# Patient Record
Sex: FEMALE | Race: WHITE | Employment: OTHER | URBAN - METROPOLITAN AREA
[De-identification: names, ages, dates, MRNs, and addresses within clinical notes are randomized per-mention and may not be internally consistent; named-entity substitution may affect disease eponyms.]

---

## 2018-05-29 LAB
CHLAMYDIA, EXTERNAL: NEGATIVE
HBSAG, EXTERNAL: NEGATIVE
HIV, EXTERNAL: NON REACTIVE
N. GONORRHEA, EXTERNAL: NEGATIVE
RUBELLA, EXTERNAL: NORMAL
TYPE, ABO & RH, EXTERNAL: NORMAL

## 2018-08-22 LAB
ANTIBODY SCREEN, EXTERNAL: NEGATIVE
T. PALLIDUM, EXTERNAL: NEGATIVE

## 2018-10-03 ENCOUNTER — HOSPITAL ENCOUNTER (OUTPATIENT)
Age: 33
Setting detail: OBSERVATION
Discharge: HOME | End: 2018-10-04
Attending: EMERGENCY MEDICINE | Admitting: OBSTETRICS & GYNECOLOGY
Payer: MEDICAID

## 2018-10-03 DIAGNOSIS — R07.9 CHEST PAIN, UNSPECIFIED TYPE: Primary | ICD-10-CM

## 2018-10-03 PROBLEM — O13.3 PIH (PREGNANCY INDUCED HYPERTENSION), THIRD TRIMESTER: Status: ACTIVE | Noted: 2018-10-03

## 2018-10-03 LAB
ALBUMIN SERPL-MCNC: 2.9 G/DL (ref 3.5–5)
ALBUMIN/GLOB SERPL: 0.7 {RATIO} (ref 1.1–2.2)
ALP SERPL-CCNC: 116 U/L (ref 45–117)
ALT SERPL-CCNC: 20 U/L (ref 12–78)
ANION GAP SERPL CALC-SCNC: 7 MMOL/L (ref 5–15)
APPEARANCE UR: CLEAR
AST SERPL-CCNC: 19 U/L (ref 15–37)
BASOPHILS # BLD: 0 K/UL (ref 0–0.1)
BASOPHILS NFR BLD: 0 % (ref 0–1)
BILIRUB SERPL-MCNC: 0.3 MG/DL (ref 0.2–1)
BILIRUB UR QL: NEGATIVE
BNP SERPL-MCNC: 249 PG/ML (ref 0–125)
BUN SERPL-MCNC: 4 MG/DL (ref 6–20)
BUN/CREAT SERPL: 9 (ref 12–20)
CALCIUM SERPL-MCNC: 9 MG/DL (ref 8.5–10.1)
CHLORIDE SERPL-SCNC: 106 MMOL/L (ref 97–108)
CO2 SERPL-SCNC: 23 MMOL/L (ref 21–32)
COLOR UR: NORMAL
CREAT SERPL-MCNC: 0.47 MG/DL (ref 0.55–1.02)
CREAT UR-MCNC: 34.5 MG/DL
D DIMER PPP FEU-MCNC: 0.7 MG/L FEU (ref 0–0.65)
DIFFERENTIAL METHOD BLD: ABNORMAL
EOSINOPHIL # BLD: 0.2 K/UL (ref 0–0.4)
EOSINOPHIL NFR BLD: 2 % (ref 0–7)
ERYTHROCYTE [DISTWIDTH] IN BLOOD BY AUTOMATED COUNT: 12.5 % (ref 11.5–14.5)
GLOBULIN SER CALC-MCNC: 3.9 G/DL (ref 2–4)
GLUCOSE SERPL-MCNC: 75 MG/DL (ref 65–100)
GLUCOSE UR STRIP.AUTO-MCNC: NEGATIVE MG/DL
HCT VFR BLD AUTO: 33.5 % (ref 35–47)
HGB BLD-MCNC: 12 G/DL (ref 11.5–16)
HGB UR QL STRIP: NEGATIVE
IMM GRANULOCYTES # BLD: 0.1 K/UL (ref 0–0.04)
IMM GRANULOCYTES NFR BLD AUTO: 1 % (ref 0–0.5)
KETONES UR QL STRIP.AUTO: NEGATIVE MG/DL
LEUKOCYTE ESTERASE UR QL STRIP.AUTO: NEGATIVE
LYMPHOCYTES # BLD: 2.2 K/UL (ref 0.8–3.5)
LYMPHOCYTES NFR BLD: 18 % (ref 12–49)
MCH RBC QN AUTO: 32.3 PG (ref 26–34)
MCHC RBC AUTO-ENTMCNC: 35.8 G/DL (ref 30–36.5)
MCV RBC AUTO: 90.3 FL (ref 80–99)
MONOCYTES # BLD: 1.1 K/UL (ref 0–1)
MONOCYTES NFR BLD: 9 % (ref 5–13)
NEUTS SEG # BLD: 8.4 K/UL (ref 1.8–8)
NEUTS SEG NFR BLD: 71 % (ref 32–75)
NITRITE UR QL STRIP.AUTO: NEGATIVE
NRBC # BLD: 0 K/UL (ref 0–0.01)
NRBC BLD-RTO: 0 PER 100 WBC
PH UR STRIP: 7.5 [PH] (ref 5–8)
PLATELET # BLD AUTO: 281 K/UL (ref 150–400)
PMV BLD AUTO: 10.8 FL (ref 8.9–12.9)
POTASSIUM SERPL-SCNC: 3.5 MMOL/L (ref 3.5–5.1)
PROT SERPL-MCNC: 6.8 G/DL (ref 6.4–8.2)
PROT UR STRIP-MCNC: NEGATIVE MG/DL
PROT UR-MCNC: 9 MG/DL (ref 0–11.9)
PROT/CREAT UR-RTO: 0.3
RBC # BLD AUTO: 3.71 M/UL (ref 3.8–5.2)
SODIUM SERPL-SCNC: 136 MMOL/L (ref 136–145)
SP GR UR REFRACTOMETRY: 1.01 (ref 1–1.03)
TROPONIN I SERPL-MCNC: <0.05 NG/ML
UROBILINOGEN UR QL STRIP.AUTO: 0.2 EU/DL (ref 0.2–1)
WBC # BLD AUTO: 12 K/UL (ref 3.6–11)

## 2018-10-03 PROCEDURE — 93005 ELECTROCARDIOGRAM TRACING: CPT

## 2018-10-03 PROCEDURE — 85379 FIBRIN DEGRADATION QUANT: CPT | Performed by: EMERGENCY MEDICINE

## 2018-10-03 PROCEDURE — 85302 CLOT INHIBIT PROT C ANTIGEN: CPT | Performed by: EMERGENCY MEDICINE

## 2018-10-03 PROCEDURE — 83880 ASSAY OF NATRIURETIC PEPTIDE: CPT | Performed by: EMERGENCY MEDICINE

## 2018-10-03 PROCEDURE — 99218 HC RM OBSERVATION: CPT

## 2018-10-03 PROCEDURE — 85025 COMPLETE CBC W/AUTO DIFF WBC: CPT | Performed by: EMERGENCY MEDICINE

## 2018-10-03 PROCEDURE — 84156 ASSAY OF PROTEIN URINE: CPT | Performed by: OBSTETRICS & GYNECOLOGY

## 2018-10-03 PROCEDURE — 81003 URINALYSIS AUTO W/O SCOPE: CPT | Performed by: EMERGENCY MEDICINE

## 2018-10-03 PROCEDURE — 99285 EMERGENCY DEPT VISIT HI MDM: CPT

## 2018-10-03 PROCEDURE — 80053 COMPREHEN METABOLIC PANEL: CPT | Performed by: EMERGENCY MEDICINE

## 2018-10-03 PROCEDURE — 36415 COLL VENOUS BLD VENIPUNCTURE: CPT | Performed by: EMERGENCY MEDICINE

## 2018-10-03 PROCEDURE — 84484 ASSAY OF TROPONIN QUANT: CPT | Performed by: EMERGENCY MEDICINE

## 2018-10-03 RX ORDER — SODIUM CHLORIDE 0.9 % (FLUSH) 0.9 %
5-10 SYRINGE (ML) INJECTION EVERY 8 HOURS
Status: DISCONTINUED | OUTPATIENT
Start: 2018-10-03 | End: 2018-10-04 | Stop reason: HOSPADM

## 2018-10-03 RX ORDER — SODIUM CHLORIDE 0.9 % (FLUSH) 0.9 %
5-10 SYRINGE (ML) INJECTION AS NEEDED
Status: DISCONTINUED | OUTPATIENT
Start: 2018-10-03 | End: 2018-10-04 | Stop reason: HOSPADM

## 2018-10-03 RX ORDER — ESCITALOPRAM OXALATE 10 MG/1
20 TABLET ORAL
COMMUNITY

## 2018-10-03 NOTE — IP AVS SNAPSHOT
[address] [facility] 
[phone] Patient: Bola Zepeda MRN: [id number] :1985 About your hospitalization You were admitted on:  October 3, 2018 You last received care in the:  [facility] 3 LABOR & DELIVERY You were discharged on:  2018 Why you were hospitalized Your primary diagnosis was:  Not on File Your diagnoses also included:  Pih (Pregnancy Induced Hypertension), Third Trimester Follow-up Information Follow up With Details Comments Contact Info UNKNOWN Discharge Orders None A check austin indicates which time of day the medication should be taken. My Medications CONTINUE taking these medications Instructions Each Dose to Equal  
 Morning Noon Evening Bedtime LEXAPRO 10 mg tablet Generic drug:  escitalopram oxalate Your last dose was: Your next dose is: Take 10 mg by mouth daily. 10 mg Discharge Instructions Follow up with your provider as scheduled. Weeks 34 to 36 of Your Pregnancy: Care Instructions Your Care Instructions By now, your baby and your belly have grown quite large. It is almost time to give birth. A full-term pregnancy can deliver between 37 and 42 weeks. Your baby's lungs are almost ready to breathe air. The bones in your baby's head are now firm enough to protect it, but soft enough to move down through the birth canal. 
You may feel excited, happy, anxious, or scared. You may wonder how you will know if you are in labor or what to expect during labor. Try to be flexible in your expectations of the birth. Because each birth is different, there is no way to know exactly what childbirth will be like for you. This care sheet will help you know what to expect and how to prepare. This may make your childbirth easier. If you haven't already had the Tdap shot during this pregnancy, talk to your doctor about getting it. It will help protect your  against pertussis infection. In the 36th week, most women have a test for group B streptococcus (GBS). GBS is a common bacteria that can live in the vagina and rectum. It can make your baby sick after birth. If you test positive, you will get antibiotics during labor. The medicine will keep your baby from getting the bacteria. Follow-up care is a key part of your treatment and safety. Be sure to make and go to all appointments, and call your doctor if you are having problems. It's also a good idea to know your test results and keep a list of the medicines you take. How can you care for yourself at home? Learn about pain relief choices · Pain is different for every woman. Talk with your doctor about your feelings about pain. · You can choose from several types of pain relief. These include medicine or breathing techniques, as well as comfort measures. You can use more than one option. · If you choose to have pain medicine during labor, talk to your doctor about your options. Some medicines lower anxiety and help with some of the pain. Others make your lower body numb so that you won't feel pain. · Be sure to tell your doctor about your pain medicine choice before you start labor or very early in your labor. You may be able to change your mind as labor progresses. · Rarely, a woman is put to sleep by medicine given through a mask or an IV. Labor and delivery · The first stage of labor has three parts: early, active, and transition. ¨ Most women have early labor at home. You can stay busy or rest, eat light snacks, drink clear fluids, and start counting contractions. ¨ When talking during a contraction gets hard, you may be moving to active labor. During active labor, you should head for the hospital if you are not there already. ¨ You are in active labor when contractions come every 3 to 4 minutes and last about 60 seconds. Your cervix is opening more rapidly. ¨ If your water breaks, contractions will come faster and stronger. ¨ During transition, your cervix is stretching, and contractions are coming more rapidly. ¨ You may want to push, but your cervix might not be ready. Your doctor will tell you when to push. · The second stage starts when your cervix is completely opened and you are ready to push. ¨ Contractions are very strong to push the baby down the birth canal. 
¨ You will feel the urge to push. You may feel like you need to have a bowel movement. ¨ You may be coached to push with contractions. These contractions will be very strong, but you will not have them as often. You can get a little rest between contractions. ¨ You may be emotional and irritable. You may not be aware of what is going on around you. ¨ One last push, and your baby is born. · The third stage is when a few more contractions push out the placenta. This may take 30 minutes or less. · The fourth stage is the welcome recovery. You may feel overwhelmed with emotions and exhausted but alert. This is a good time to start breastfeeding. Where can you learn more? Go to http://mason-lucy.info/. Enter Y186 in the search box to learn more about \"Weeks 34 to 36 of Your Pregnancy: Care Instructions. \" Current as of: November 21, 2017 Content Version: 11.8 © 6614-1397 [employer]. Care instructions adapted under license by [employer] (which disclaims liability or warranty for this information). If you have questions about a medical condition or this instruction, always ask your healthcare professional. [address] any warranty or liability for your use of this information. [email or web address] Announcement  We are excited to announce that we are making your provider's discharge notes available to you in [place] [facility]. You will see these notes when they are completed and signed by the physician that discharged you from your recent hospital stay. If you have any questions or concerns about any information you see in [place] [facility], please call the Health Information Department where you were seen or reach out to your Primary Care Provider for more information about your plan of care. Introducing [facility]! [facility] introduces [place] [facility] patient portal. Now you can access parts of your medical record, email your doctor's office, and request medication refills online. 1. In your internet browser, go to [email or web address] 2. Click on the First Time User? Click Here link in the Sign In box. You will see the New Member Sign Up page. 3. Enter your [place] [facility] Access Code exactly as it appears below. You will not need to use this code after youve completed the sign-up process. If you do not sign up before the expiration date, you must request a new code. · [place] [facility] Access Code: [email or web address] Expires: 1/1/2019  8:32 PM 
 
4. Enter the last four digits of your Social Security Number (xxxx) and Date of Birth (mm/dd/yyyy) as indicated and click Submit. You will be taken to the next sign-up page. 5. Create a [place] [facility] ID. This will be your [place] [facility] login ID and cannot be changed, so think of one that is secure and easy to remember. 6. Create a [place] [facility] password. You can change your password at any time. 7. Enter your Password Reset Question and Answer. This can be used at a later time if you forget your password. 8. Enter your e-mail address. You will receive e-mail notification when new information is available in [address] 9. Click Sign Up. You can now view and download portions of your medical record. 10. Click the Download Summary menu link to download a portable copy of your medical information. If you have questions, please visit the Frequently Asked Questions section of the MyChart website. Remember, [employer] is NOT to be used for urgent needs. For medical emergencies, dial 911. Now available from your iPhone and Android! Introducing Mahendra Bernard As a [facility] patient, I wanted to make you aware of our electronic visit tool called Mahendra Bernard. [facility] [employer] 24/7 allows you to connect within minutes with a medical provider 24 hours a day, seven days a week via a mobile device or tablet or logging into a secure website from your computer. You can access Mahendra Bernard from anywhere in the [place]. A virtual visit might be right for you when you have a simple condition and feel like you just dont want to get out of bed, or cant get away from work for an appointment, when your regular [facility] provider is not available (evenings, weekends or holidays), or when youre out of town and need minor care. Electronic visits cost only $49 and if the [facility] 24/7 provider determines a prescription is needed to treat your condition, one can be electronically transmitted to a nearby pharmacy*. Please take a moment to enroll today if you have not already done so. The enrollment process is free and takes just a few minutes. To enroll, please download the [employer] 24/7 dusty to your tablet or phone, or visit [email or web address] to enroll on your computer. And, as an [address] [place] [address] patient with a [employer] account, the results of your visits will be scanned into your electronic medical record and your primary care provider will be able to view the scanned results. We urge you to continue to see your regular [facility] provider for your ongoing medical care.   And while your primary care provider may not be the one available when you seek a Mahendra Bernard virtual visit, the peace of mind you get from getting a real diagnosis real time can be priceless. For more information on Mahendra Dobbsmaria elenafin, view our Frequently Asked Questions (FAQs) at [email or web address]. Sincerely, 
 
Anitha Alonso MD 
Chief Medical Officer Jerilyn Ardon *:  certain medications cannot be prescribed via Mahendra Rinkuthor Unresulted Labs-Please follow up with your PCP about these lab tests Order Current Status PROTEIN C, TOTAL In process Providers Seen During Your Hospitalization Provider Specialty Primary office phone [place]. Hammad Bocanegra MD Emergency Medicine [phone] Blue De Jesus MD Obstetrics & Gynecology [phone] Your Primary Care Physician (PCP) Primary Care Physician Office Phone Office Fax UNKNOWN ** None ** ** None ** You are allergic to the following Allergen Reactions Banana Itching Carrot Itching Peach (Prunus Persica) Itching Tree Nut Itching Watermelon Itching Zoloft (Sertraline) Rash Recent Documentation Height Weight BMI OB Status Smoking Status 1.676 m 88.6 kg 31.53 kg/m2 Pregnant Current Some Day Smoker Emergency Contacts Name Discharge Info Relation Home Work Mobile Chetan Paez DISCHARGE CAREGIVER [3] Mother [14] [phone] Patient Belongings The following personal items are in your possession at time of discharge: 
     Visual Aid: Glasses, With patient Discharge Instructions Attachments/References PREECLAMPSIA (ENGLISH) Patient Handouts Preeclampsia: Care Instructions Your Care Instructions Preeclampsia occurs when a woman's blood pressure rises during pregnancy. Often with preeclampsia, you also have swelling in your legs, hands, and face. A test may show too much protein in your urine.  Preeclampsia is also called toxemia. If preeclampsia is severe and not treated, it can lead to seizures (eclampsia) and damage to your liver or kidneys. Preeclampsia can prevent your baby from getting enough food and oxygen. This can cause a low birth weight or other problems. Your doctor will watch you closely to prevent these problems. He or she also may recommend that you rest in bed most of the day. If your preeclampsia is a danger to your health or the health of your baby, your doctor may need to deliver your baby early. While preeclampsia is a concern, most women with preeclampsia have healthy babies. After a woman gives birth, preeclampsia usually goes away on its own. Follow-up care is a key part of your treatment and safety. Be sure to make and go to all appointments, and call your doctor if you are having problems. It's also a good idea to know your test results and keep a list of the medicines you take. How can you care for yourself at home? · Take and record your blood pressure at home if your doctor tells you to. ¨ Learn the importance of the two measures of blood pressure (such as 120 over 80, or 120/80). The first number is the systolic pressure, which is the force of blood on the artery walls as the heart pumps. The second number is the diastolic pressure, which is the force of blood on the artery walls between heartbeats, when the heart is at rest. You have a choice of monitors to use. ¨ Manual monitor: You pump up the cuff and use a stethoscope to listen for your pulse. ¨ Electronic monitor: The cuff inflates, and a gauge shows your pulse rate. ¨ To take your blood pressure: ¨ Ask your doctor to check your blood pressure monitor to be sure that it is accurate and that the cuff fits you. Also ask your doctor to watch you to make sure that you are using it right.  
¨ You should not eat, use tobacco products, or use medicine known to raise blood pressure (such as some nasal decongestant sprays) before you take your blood pressure. ¨ Avoid taking your blood pressure if you have just exercised or are nervous or upset. Rest at least 15 minutes before you take your blood pressure. · If your doctor advises, check the protein levels in your urine. Your doctor or nurse will show you how to do this. · Take your medicines exactly as prescribed. Call your doctor if you think you are having a problem with your medicine. · Do not smoke. Quitting smoking will help lower your blood pressure and improve your baby's growth and health. If you need help quitting, talk to your doctor about stop-smoking programs and medicines. These can increase your chances of quitting for good. · Eat a balanced and healthy diet that has lots of fruits and vegetables. · If your doctor advised bed rest, be sure to stay off your feet and rest as much as possible. ¨ Keep a phone, phone book, notepad, and pen near the bed where you can easily reach them. ¨ Gently stretch your legs every hour to maintain good blood flow. ¨ Have another family member pack snacks and lunch food in a cooler close to your bed. ¨ Use this time for activities that you usually cannot find time for, such as reading, craft projects, or letter writing. · You can keep track of your baby's health by noting the length of time it takes to count 10 movements (such as kicks, flutters, or rolls). Feeling 10 movements in less than 1 hour is considered normal. Track your baby's movements once each day, and bring this record with you to each prenatal visit. When should you call for help? Call 911 anytime you think you may need emergency care. For example, call if: 
  · You passed out (lost consciousness).  
  · You have a seizure.  
 Call your doctor now or seek immediate medical care if: 
  · You have symptoms of preeclampsia, such as: 
¨ Sudden swelling of your face, hands, or feet. ¨ New vision problems (such as dimness or blurring). ¨ A severe headache.   · Your blood pressure is higher than it should be, or it rises suddenly.  
  · You have new nausea or vomiting.  
  · You think that you are in labor.  
  · You have pain in your belly or pelvis.  
 Watch closely for changes in your health, and be sure to contact your doctor if: 
  · You gain weight rapidly. Where can you learn more? Go to http://mason-lucy.info/. Enter F445 in the search box to learn more about \"Preeclampsia: Care Instructions. \" Current as of: November 21, 2017 Content Version: 11.8 © 9165-5109 [employer]. Care instructions adapted under license by [employer]yl (which disclaims liability or warranty for this information). If you have questions about a medical condition or this instruction, always ask your healthcare professional. [address] any warranty or liability for your use of this information. Please provide this summary of care documentation to your next provider. Signatures-by signing, you are acknowledging that this After Visit Summary has been reviewed with you and you have received a copy. Patient Signature:  ____________________________________________________________ Date:  ____________________________________________________________  
  
Destiny Gilliam Provider Signature:  ____________________________________________________________ Date:  ____________________________________________________________

## 2018-10-03 NOTE — IP AVS SNAPSHOT
[address] [facility]. 
[phone] Patient: Leana Gilliam MRN: [id number] :1985 A check austin indicates which time of day the medication should be taken. My Medications CONTINUE taking these medications Instructions Each Dose to Equal  
 Morning Noon Evening Bedtime LEXAPRO 10 mg tablet Generic drug:  escitalopram oxalate Your last dose was: Your next dose is: Take 10 mg by mouth daily.   
 10 mg

## 2018-10-04 VITALS
TEMPERATURE: 98 F | BODY MASS INDEX: 31.39 KG/M2 | OXYGEN SATURATION: 98 % | SYSTOLIC BLOOD PRESSURE: 135 MMHG | HEART RATE: 70 BPM | DIASTOLIC BLOOD PRESSURE: 85 MMHG | WEIGHT: 195.33 LBS | RESPIRATION RATE: 16 BRPM | HEIGHT: 66 IN

## 2018-10-04 LAB
ATRIAL RATE: 66 BPM
CALCULATED P AXIS, ECG09: 29 DEGREES
CALCULATED R AXIS, ECG10: 21 DEGREES
CALCULATED T AXIS, ECG11: 29 DEGREES
DIAGNOSIS, 93000: NORMAL
GLUCOSE BLD STRIP.AUTO-MCNC: 101 MG/DL (ref 65–100)
GLUCOSE BLD STRIP.AUTO-MCNC: 166 MG/DL (ref 65–100)
P-R INTERVAL, ECG05: 154 MS
Q-T INTERVAL, ECG07: 386 MS
QRS DURATION, ECG06: 74 MS
QTC CALCULATION (BEZET), ECG08: 404 MS
SERVICE CMNT-IMP: ABNORMAL
SERVICE CMNT-IMP: ABNORMAL
VENTRICULAR RATE, ECG03: 66 BPM

## 2018-10-04 PROCEDURE — 82962 GLUCOSE BLOOD TEST: CPT

## 2018-10-04 PROCEDURE — 99218 HC RM OBSERVATION: CPT

## 2018-10-04 RX ADMIN — Medication 10 ML: at 07:20

## 2018-10-04 NOTE — PROGRESS NOTES
7:20 AM  Bedside shift change report given to Noemí Smith RN (oncoming nurse) by Gregory Lundberg RN (offgoing nurse). Report included the following information SBAR, Kardex, Intake/Output, MAR and Recent Results. 9:20 AM  Dr Kathryn Carr in with pt. Pt to be discharged today. 10:15 AM  NST complete, reactive. 12:30 PM  Discharge instructions reviewed and signed. Pt instructed to call her provider and come back if she has high blood pressures, blurry or spotty vision, severe headache, leaking of fluids, vaginal bleeding, decreased fetal movement, or contractions that are regular and 5 mins or less apart. IV removed. Pt's mother here and pt discharged home.

## 2018-10-04 NOTE — PROGRESS NOTES
Pt reports she had some diarrhea then \"myheart felt fast\" \"and alitttle SOB'- appears calm. VSS. No distressnoted. Before leaving room pt was stating she is better. 0600 sleeping    0720 bedside rpeort given to JANKI Floyd [employer]

## 2018-10-04 NOTE — PROGRESS NOTES
Pt arrived to L&D from ED for observational stay. EFM and toco placed along with serial BP's set up.      2322 - Dr. Worrell Maggi in to speak with patient and review POC for the night. 2330 - EFM and toco removed after NST - contractions showing on toco, pt does not feel them at all.   Reactive NST

## 2018-10-04 NOTE — DISCHARGE SUMMARY
Antepartum  Discharge Summary     Patient ID:  Kentrell Zuluaga  [id number]  4 y.o.  1985    Admit date: 10/3/2018    Discharge date: 10/4/2018    Admission Diagnoses:    Patient Active Problem List   Diagnosis Code    Vitamin D deficiency E55.9    Multiple allergies Z88.9    Situational mixed anxiety and depressive disorder F43.23    PIH (pregnancy induced hypertension), third trimester O13.3       Discharge Diagnoses: There are no discharge diagnoses documented for the most recent discharge. Patient Active Problem List   Diagnosis Code    Vitamin D deficiency E55.9    Multiple allergies Z88.9    Situational mixed anxiety and depressive disorder F43.23    PIH (pregnancy induced hypertension), third trimester O13.3       Procedures for this admission: None    Hospital Course: Patient was admitted on 10/3/18 at 34w5d with preeclampsia without severe features. BPs normal-mild range. Labs normal other than UPC 0.3. No HA, vision changes, RUQ pain. Reactive NST. Cleared by ED for chest pain with normal troponins and EKG. Reviewed options including ongoing inpatient management vs. Outpatient management for preeclampsia. Patient prefers outpatient management. She will f/u with Dr. Awais Vallejo for weekly BP/NST/BPP/labs. Reviewed IOL 37-38 weeks. Reviewed strict precautions and has BP cuff at home. Patient desires to increase Lexapro from 10--> 20 mg. Has sufficient supply to take 2 pills daily until next f/u visit with primary provider. Disposition: home    Discharged Condition: good    Patient plans to return for changes in her condition or the condition of the baby or for delivery of the baby. Patient Instructions:   Current Discharge Medication List      CONTINUE these medications which have NOT CHANGED    Details   escitalopram oxalate (LEXAPRO) 10 mg tablet Take 10 mg by mouth daily.            Activity: Light activity  Diet: Regular Diet    Follow-up with   Follow-up Appointments Procedures    FOLLOW UP VISIT Appointment in: One Week     Standing Status:   Standing     Number of Occurrences:   1     Order Specific Question:   Appointment in     Answer:    One Week        Signed:  Duane Valerio MD  10/4/2018  10:58 AM

## 2018-10-04 NOTE — PROGRESS NOTES
Ante Partum Progress Note    Janay Paez  34w6d    Assessment: 34w6d   Pre-eclampsia, mild   -normal-mild range BPs, no HA/vision changes/RUQ pain, normal labs other than UPC 0.3.   -reactive NST inpatient. Normal growth (49% on ). -Reviewed warning signs.   -Plan outpatient management with labs/NST/BPP/BP checks. Delivery 37-38 weeks. A1GDM   -Continue diet control. Anxiety/depression   -Increase lexapro from 10 mg to 20 mg daily. Plan:  Discharge home with the following: Activity: decreased/light activity Diet: diabetic. Follow up in office: next week. Medications: prenatal vitamins. Orders/Charges: Medium    Patient states she does not have headache , contractions, right upper quadrant pain  , vaginal bleeding , vaginal leaking of fluid  and vision changes. Vitals:  Visit Vitals    /86    Pulse 69    Temp 98.1 °F (36.7 °C)    Resp 16    Ht 5' 6\" (1.676 m)    Wt 88.6 kg (195 lb 5.2 oz)    SpO2 98%    BMI 31.53 kg/m2     Temp (24hrs), Av.1 °F (36.7 °C), Min:98 °F (36.7 °C), Max:98.1 °F (36.7 °C)      Last 24hr Input/Output:  No intake or output data in the 24 hours ending 10/04/18 1130     Non stress test:  Reactive    Patient Vitals for the past 4 hrs: Mode Fetal Heart Rate Fetal Activity Variability Decelerations Accelerations RN Reviewed Strip? Non Stress Test   10/04/18 1015 External 135 Present 6-25 BPM None Yes Yes Reactive   10/04/18 0952 External - - - - - - -    Patient Vitals for the past 4 hrs: Mode Fetal Heart Rate Fetal Activity Variability Decelerations Accelerations RN Reviewed Strip? Non Stress Test   10/04/18 1015 External 135 Present 6-25 BPM None Yes Yes Reactive   10/04/18 0952 External - - - - - - -        Uterine Activity: None     Exam:  Patient without distress.      Abdomen, fundus soft non-tender     Extremities, no redness or tenderness               Additional Exam: Deferred    Labs:     Lab Results   Component Value Date/Time    WBC 12.0 (H) 10/03/2018 09:08 PM    WBC 10.5 04/30/2015 12:05 AM    HGB 12.0 10/03/2018 09:08 PM    HGB 13.8 04/30/2015 12:05 AM    HCT 33.5 (L) 10/03/2018 09:08 PM    HCT 40.6 04/30/2015 12:05 AM    PLATELET 221 78/61/8620 09:08 PM    PLATELET 279 33/47/1698 12:05 AM       Recent Results (from the past 24 hour(s))   EKG, 12 LEAD, INITIAL    Collection Time: 10/03/18  8:41 PM   Result Value Ref Range    Ventricular Rate 66 BPM    Atrial Rate 66 BPM    P-R Interval 154 ms    QRS Duration 74 ms    Q-T Interval 386 ms    QTC Calculation (Bezet) 404 ms    Calculated P Axis 29 degrees    Calculated R Axis 21 degrees    Calculated T Axis 29 degrees    Diagnosis       Normal sinus rhythm with sinus arrhythmia  Normal ECG  When compared with ECG of 29-APR-2015 22:42,  No significant change was found  Confirmed by Joey Barnett ([id number]) on 10/4/2018 8:35:16 AM     CBC WITH AUTOMATED DIFF    Collection Time: 10/03/18  9:08 PM   Result Value Ref Range    WBC 12.0 (H) 3.6 - 11.0 K/uL    RBC 3.71 (L) 3.80 - 5.20 M/uL    HGB 12.0 11.5 - 16.0 g/dL    HCT 33.5 (L) 35.0 - 47.0 %    MCV 90.3 80.0 - 99.0 FL    MCH 32.3 26.0 - 34.0 PG    MCHC 35.8 30.0 - 36.5 g/dL    RDW 12.5 11.5 - 14.5 %    PLATELET 622 065 - 185 K/uL    MPV 10.8 8.9 - 12.9 FL    NRBC 0.0 0  WBC    ABSOLUTE NRBC 0.00 0.00 - 0.01 K/uL    NEUTROPHILS 71 32 - 75 %    LYMPHOCYTES 18 12 - 49 %    MONOCYTES 9 5 - 13 %    EOSINOPHILS 2 0 - 7 %    BASOPHILS 0 0 - 1 %    IMMATURE GRANULOCYTES 1 (H) 0.0 - 0.5 %    ABS. NEUTROPHILS 8.4 (H) 1.8 - 8.0 K/UL    ABS. LYMPHOCYTES 2.2 0.8 - 3.5 K/UL    ABS. MONOCYTES 1.1 (H) 0.0 - 1.0 K/UL    ABS. EOSINOPHILS 0.2 0.0 - 0.4 K/UL    ABS. BASOPHILS 0.0 0.0 - 0.1 K/UL    ABS. IMM.  GRANS. 0.1 (H) 0.00 - 0.04 K/UL    DF AUTOMATED     METABOLIC PANEL, COMPREHENSIVE    Collection Time: 10/03/18  9:08 PM   Result Value Ref Range    Sodium 136 136 - 145 mmol/L    Potassium 3.5 3.5 - 5.1 mmol/L    Chloride 106 97 - 108 mmol/L    CO2 23 21 - 32 mmol/L Anion gap 7 5 - 15 mmol/L    Glucose 75 65 - 100 mg/dL    BUN 4 (L) 6 - 20 MG/DL    Creatinine 0.47 (L) 0.55 - 1.02 MG/DL    BUN/Creatinine ratio 9 (L) 12 - 20      GFR est AA >60 >60 ml/min/1.73m2    GFR est non-AA >60 >60 ml/min/1.73m2    Calcium 9.0 8.5 - 10.1 MG/DL    Bilirubin, total 0.3 0.2 - 1.0 MG/DL    ALT (SGPT) 20 12 - 78 U/L    AST (SGOT) 19 15 - 37 U/L    Alk. phosphatase 116 45 - 117 U/L    Protein, total 6.8 6.4 - 8.2 g/dL    Albumin 2.9 (L) 3.5 - 5.0 g/dL    Globulin 3.9 2.0 - 4.0 g/dL    A-G Ratio 0.7 (L) 1.1 - 2.2     NT-PRO BNP    Collection Time: 10/03/18  9:08 PM   Result Value Ref Range    NT pro- (H) 0 - 125 PG/ML   TROPONIN I    Collection Time: 10/03/18  9:08 PM   Result Value Ref Range    Troponin-I, Qt. <0.05 <0.05 ng/mL   D DIMER    Collection Time: 10/03/18  9:08 PM   Result Value Ref Range    D-dimer 0.70 (H) 0.00 - 0.65 mg/L FEU   URINALYSIS W/ RFLX MICROSCOPIC    Collection Time: 10/03/18  9:08 PM   Result Value Ref Range    Color YELLOW/STRAW      Appearance CLEAR CLEAR      Specific gravity 1.007 1.003 - 1.030      pH (UA) 7.5 5.0 - 8.0      Protein NEGATIVE  NEG mg/dL    Glucose NEGATIVE  NEG mg/dL    Ketone NEGATIVE  NEG mg/dL    Bilirubin NEGATIVE  NEG      Blood NEGATIVE  NEG      Urobilinogen 0.2 0.2 - 1.0 EU/dL    Nitrites NEGATIVE  NEG      Leukocyte Esterase NEGATIVE  NEG     PROTEIN/CREATININE RATIO, URINE    Collection Time: 10/03/18  9:08 PM   Result Value Ref Range    Protein, urine random 9 0.0 - 11.9 mg/dL    Creatinine, urine 34.50 mg/dL    Protein/Creat.  urine Ratio 0.3     GLUCOSE, POC    Collection Time: 10/04/18 12:36 AM   Result Value Ref Range    Glucose (POC) 166 (H) 65 - 100 mg/dL    Performed by Jade Ruffin    GLUCOSE, POC    Collection Time: 10/04/18  7:15 AM   Result Value Ref Range    Glucose (POC) 101 (H) 65 - 100 mg/dL    Performed by Jade Ruffin

## 2018-10-04 NOTE — ED TRIAGE NOTES
Complaints of chest pain with left arm pain for 2 weeks. Associated with shortness of breath, dizziness and nausea. For 3 days has noticed increased foot and ankle swelling. Also, complaints of headache and black spots in vision. 34 weeks pregnant,  1 Para 0.    OB Dr. Fariba Stephens.

## 2018-10-04 NOTE — H&P
EDC:2018  EGA: 34 weeks, 5 days      History of Present Illness: This  at 34+ weeks presents c/o several day h/o chest pain and SOB. BPs have been followed closer in OP with diastolics of 88. Pt states that her BP at home was R67E/86R with one diastolic of 757. Pt has been having occasional spots before her eyes for a week. None currently. No H/A,vaginal bleeding,ROM,decreased FM. Rare cramping. Patient's Prenatal Care with Doctor of Record Connie Mantilla MD Notable For -    Diabetes gestational diet controlled  *Note:Scoliosis  RH negative rhogam 28 wks (given )  Questionable Low lying placenta, resolved 20wk  Anxiety/depression, on lexapro   lab screening  Normal pregnancy primigravida, girl  HSV-Valtrex @ 39 wks___          Impression & Recommendations:  1)R/O PIH v Pre-e  2)IUP at 34+ weeks  3)Chest pain and SOB evaluated in ED and cleared    Plan: Pt admitted to observation for serial BPs. labs normal. Prt/Cr pending. NST BID for now. T/C steroids pending observation of BPs. Past Pregnancy History      :  2     Term Births:  0     Premature Births: 0     Living Children: 0     Para:   0     Prev : 0     Aborta:  1     Elect. Ab:  1     Spont.  Ab:  0     Ectopics:  0    Pregnancy # 1     Delivery date:   2015     Comments:  D&C        Past Medical History:     Reviewed history from 2018 and no changes required:        seasonal allergies        Anxiety        Depression - prior lexapro        Scoliosis    Past Surgical History:     Reviewed history from 10/12/2015 and no changes required:        breast reduction 2007        oral surgery        Tonsillectomy    Family History Summary:      Reviewed history Last on 2018 and no changes required:10/03/2018  Other family member - Has Family History of Breast Cancer - MGA - Entered On: 2017  Other family member - Has Family History of Ovarian Cancer - MGA - Entered On: 4/6/2017  Cousin (female) - Has Family History of Other Cancer - Skin - Entered On: 4/6/2017  MGM - Has Family History of Other Cancer - Skin - Entered On: 4/6/2017  Father (Citlaly Petit) - Has Family History of Hypertension - Entered On: 4/6/2017  Aunt - Has Family History of Diabetes - Paternal - Entered On: 4/6/2017    General Comments - FH:  Family history transferred to [facility] compliant     Social History:     Reviewed history from 06/27/2018 and no changes required:        Single        Not together with FOB        Family in place         works as [employer]      Previous Tobacco Use: Signed On - 04/06/2017  Smoked Tobacco Use:  Current every day smoker     Cigarettes:  Yes -- 1/2 pk per wk pack(s) per day,      Year started:  occasionally  Smokeless Tobacco Use:  Never     Counseled to quit/cut down:  yes     Tobacco Use Comments:  Advised to cut back 1 cig a day until she can stop  Passive smoke exposure:  no  Drug use:  no  HIV high-risk behavior:  no  Caffeine use:  1 drinks per day    Previous Alcohol Use: Signed On - 04/06/2017  Alcohol use:  yes     Drinks per day:  rare  Exercise:  no  Seatbelt use:  preg- %  Sun Exposure:  occasionally    Family History Risk Factors:     Family History of MI in females < 72years old:  no     Family History of MI in males < 54years old:  no    PAP Smear History:     Date of Last PAP Smear:  04/06/2017      Review of Systems     CV       Complains of swelling hands/ankles and chest pain. Denies shortness of breath on exertion, palpitation and difficulty breathing. Resp       Complains of shortness of breath. Denies wheezing, cough blood and chronic cough. Except as noted in the HPI, the review of systems is negative for General, , GI, Endo, MS, Derm, Neuro, Psych, Eyes, ENT, Allergy and Heme.     Allergies    ZOLOFT (Moderate)      Medications Removed from Medication List        Flowsheet View for Follow-up Visit     Estimated weeks of gestation:  34 5/7     Weight:  197.1     Blood pressure: 139 / 90      Vital Signs    Blood Pressure: 139 / 90  Temperature:  98.0 deg. F.  Pulse rate: 87 / minute  Resp rate: 14 / minute    Contractions:  yes  UC Frequency:  Rare    NST:   reactive     Height:   65 inches  Weight:  197.1 pounds      Physical Exam     General           General appearance:  no acute distress    Head           Inspection:   normal    Eyes           External:   EOM intact    ENT           Dental:   adequate dentition    Chest           Lungs:  clear to auscultation          Heart:  regular rate and rhythm    Extremeties           Extremeties:  tr edema bilaterally    Neurological           Reflexes:  2+ and symmetric with no pathological reflexes    Psych           Orientation:  oriented to time, place, and person          Mood:  no appearance of anxiety, depression, or agitation    Skin           Inspection:  no rashes, suspicious lesions, or ulcerations    Abdomen           Abdomen:  soft,nt,ng,nr,gravid            Impression & Recommendations:  1)R/O PIH v Pre-e  2)IUP at 34+ weeks  3)Chest pain and SOB evaluated in ED and cleared    Plan: Pt admitted to observation for serial BPs. labs normal. Prt/Cr pending. NST BID for now. T/C steroids pending observation of BPs. Medications (at conclusion of this visit)    09/05/2018 [employer] LANCETS (LANCETS) Test once fasting before breakfast, and then one hour after breakfast, lunch, and dinner. Total of 4 tests daily  09/05/2018 TRUE METRIX BLOOD GLUCOSE TEST IN VITRO STRIP (GLUCOSE BLOOD) Test once fasting before breakfast, and then one hour after breakfast, lunch, and dinner. Total of 4 tests daily  09/05/2018 TRUE METRIX METER W/DEVICE KIT (BLOOD GLUCOSE MONITORING SUPPL) Test once fasting before breakfast, and then one hour after breakfast, lunch, and dinner.  Total of 4 tests daily  08/22/2018 LEXAPRO 10 MG ORAL TABLET (ESCITALOPRAM OXALATE) one tab daily  05/29/2018 PRENATAL VITAMINS TABLET (PRENATAL MV & MIN W/FE-FA TABS)           LABORATORY DATA   TEST DATE RESULT   Group B Strep culture                                     (Group B Strep Culture Result Field)   Blood Type 05/29/2018 O                                             (Blood Type Result Field)   Rh 05/29/2018 Negative                                   (Rh Result Field)   Rhogam Inj Given 08/22/2018 full dose   Tdap Vaccine Given 08/22/2018 Vacc.  [place]   Antibody Screen 08/22/2018 Negative   Rubella  [facility] Reference Ranges On or After 3/10/14                  <0.90              Non-immune      0.90 - 0.99     Equivocal      >0.99              Immune    [facility] Reference Ranges  Before 3/10/14           <5                 Non-immune             5 - 9               Equivocal            >9                 Immune  [facility] Reference Ranges       < Or = 0.90       Negative             0.91-1.09          Equivocal            > Or = 1.10       Positive   05/29/2018     2.82     TPA (T Pallidum Antibodies) 08/22/2018 Negative   Serology (RPR) 06/10/2011 Non Reactive   HBsAg 05/29/2018 Negative   HIV 05/29/2018 Non Reactive   Hemoglobin 08/22/2018 11.5   Hematocrit 08/22/2018 33.4   Platelets 66/17/2263 317 X10E3/UL   TSH 01/06/2014 1.090   Urine Culture 05/29/2018 Negative   GC DNA Probe 05/29/2018 Negative   Chlamydia DNA 05/29/2018 Negative   PAP 04/06/2017 NIL   Flu Vaccine Given 04/06/2017 Declined   HGBA1C     HGB Electro     T4, Free     BG Fasting 08/29/2018 74   GTT 1H 50G 08/22/2018 170   GTT 1H 100G 08/29/2018 212   GTT 2H 100G 08/29/2018 191   GTT 3H 100G 08/29/2018 81   Glucose Plasma     CF Accept or Decline 05/29/2018 declined   CF Screen Result     Nuchal Trans 05/29/2018 Too Late   AFP Only     Tetra     AFP Serum 05/29/2018 declined   CVS 05/29/2018 declined   AFP Amniotic     Amnio Karyo 05/29/2018 declined   FISH     GC Culture     Chlamydia Cult     Ureaplasma     Mycoplasma     WBC 05/29/2018 13.2 X10E3/UL   RBC 05/29/2018 4.06 X10E6/UL   MCV 05/29/2018 93   MCH 05/29/2018 30.3   MCHC RBC 05/29/2018 32.7     ULTRASOUND DATA   TEST DATE RESULT   Estimated Fetal Weight 06/27/2018 929.56188956^493 g&grams                                     Weight % 06/27/2018 45^45% %&percent                                                IZABEL                      BPP     Cervical Length (mm)             Electronically signed by Daniel NgHospitalist) MD on 10/03/2018 at 10:39 PM    ________________________________________________________________________

## 2018-10-04 NOTE — ED PROVIDER NOTES
EMERGENCY DEPARTMENT HISTORY AND PHYSICAL EXAM      Date: 10/3/2018  Patient Name: Letty Johnson    History of Presenting Illness     Chief Complaint   Patient presents with    Chest Pain     intermittent stabbing pain to left chest with aching pain in left arm for 2 weeks    Dizziness    Nausea       History Provided By: Patient and Patient's Mother    HPI: Letty Johnson, 29 y.o. female with PMHx significant for anxiety, presents ambulatory to the ED with cc of chest pain. She explains she has been having intermittent sharp chest pain x2 weeks with occasional SOB. The pain does not appear to be pleuritic and she is currently asx. Tonight the pain radiated into her arm so she presents for evaluation. Patient is a  34 week patient of Dr Samantha Obrien. She notes that her last few OB visits her diastolic pressure was slightly elevated but not enough to start medications. She denies headache but has some black spots in her vision off an on the past couple weeks as well as swelling the past few days (although she was off her feet today). She denies nausea, vomiting, trouble with urination, VB, cramping/contractions and spotting as well as fevers, chills and coughing. She notes she feels the baby moving frequently. There are no other complaints, changes, or physical findings at this time.     PCP: None    Current Facility-Administered Medications   Medication Dose Route Frequency Provider Last Rate Last Dose    sodium chloride (NS) flush 5-10 mL  5-10 mL IntraVENous Q8H Austin Hernandez MD        sodium chloride (NS) flush 5-10 mL  5-10 mL IntraVENous PRN Austin Hernandez MD           Past History     Past Medical History:  Past Medical History:   Diagnosis Date    Essential hypertension     pre HTN for the past 2 years, No meds    Gestational diabetes     No Meds, Diet controlled    Headache     Psychiatric disorder     Anxiety    Vitamin D deficiency            Past Surgical History:  Past Surgical History:   Procedure Laterality Date    BREAST SURGERY PROCEDURE UNLISTED  2006    bilat breast reduction    HX GYN      HX HEENT      tonsillectomy    HX OTHER SURGICAL      25 yo       Family History:  Family History   Problem Relation Age of Onset    Hypertension Father     Heart Disease Maternal Aunt     Cancer Maternal Uncle     Cancer Paternal Uncle     Heart Disease Paternal Uncle     Heart Disease Maternal Grandmother     Heart Disease Paternal Grandmother     Heart Disease Paternal Grandfather        Social History:  Social History   Substance Use Topics    Smoking status: Current Some Day Smoker     Packs/day: 0.50    Smokeless tobacco: Never Used    Alcohol use 0.0 oz/week     0 Standard drinks or equivalent per week      Comment: rare since Dec 2014       Allergies: Allergies   Allergen Reactions    Banana Itching    Carrot Itching    Peach (Prunus Persica) Itching    Tree Nut Itching    Watermelon Itching    Zoloft [Sertraline] Rash     Review of Systems   Review of Systems   Constitutional: Negative for activity change, appetite change, chills, fever and unexpected weight change. HENT: Negative for congestion. Eyes: Negative for pain and visual disturbance. Respiratory: Positive for chest tightness and shortness of breath. Negative for cough and wheezing. Cardiovascular: Positive for chest pain and leg swelling. Negative for palpitations. Gastrointestinal: Negative for abdominal pain, diarrhea, nausea and vomiting. Genitourinary: Negative for dysuria, vaginal bleeding, vaginal discharge and vaginal pain. Musculoskeletal: Negative for back pain. Skin: Negative for rash. Neurological: Negative for seizures, weakness and headaches. Physical Exam   Physical Exam   Constitutional: She is oriented to person, place, and time. She appears well-developed and well-nourished. No distress. This is a well appearing young female in NAD with BP notably elevated. HENT:   Head: Normocephalic and atraumatic. Mouth/Throat: Oropharynx is clear and moist.   Eyes: Conjunctivae and EOM are normal. Pupils are equal, round, and reactive to light. Right eye exhibits no discharge. Left eye exhibits no discharge. Neck: Normal range of motion. Neck supple. No JVD present. Cardiovascular: Normal rate, regular rhythm, normal heart sounds and intact distal pulses. Exam reveals no friction rub. No murmur heard. Pulmonary/Chest: Effort normal and breath sounds normal. No respiratory distress. She has no wheezes. She has no rales. Abdominal: Soft. Bowel sounds are normal. She exhibits no distension and no mass. There is no tenderness. There is no rebound and no guarding. Gravid uterus   Musculoskeletal: Normal range of motion. She exhibits no edema or tenderness. Neurological: She is alert and oriented to person, place, and time. No cranial nerve deficit. She exhibits normal muscle tone. Skin: Skin is warm and dry. No rash noted. She is not diaphoretic. Nursing note and vitals reviewed.       Diagnostic Study Results     Labs -     Recent Results (from the past 12 hour(s))   EKG, 12 LEAD, INITIAL    Collection Time: 10/03/18  8:41 PM   Result Value Ref Range    Ventricular Rate 66 BPM    Atrial Rate 66 BPM    P-R Interval 154 ms    QRS Duration 74 ms    Q-T Interval 386 ms    QTC Calculation (Bezet) 404 ms    Calculated P Axis 29 degrees    Calculated R Axis 21 degrees    Calculated T Axis 29 degrees    Diagnosis       Normal sinus rhythm with sinus arrhythmia  Normal ECG  When compared with ECG of 29-APR-2015 22:42,  No significant change was found     CBC WITH AUTOMATED DIFF    Collection Time: 10/03/18  9:08 PM   Result Value Ref Range    WBC 12.0 (H) 3.6 - 11.0 K/uL    RBC 3.71 (L) 3.80 - 5.20 M/uL    HGB 12.0 11.5 - 16.0 g/dL    HCT 33.5 (L) 35.0 - 47.0 %    MCV 90.3 80.0 - 99.0 FL    MCH 32.3 26.0 - 34.0 PG    MCHC 35.8 30.0 - 36.5 g/dL    RDW 12.5 11.5 - 14.5 % PLATELET 298 873 - 379 K/uL    MPV 10.8 8.9 - 12.9 FL    NRBC 0.0 0  WBC    ABSOLUTE NRBC 0.00 0.00 - 0.01 K/uL    NEUTROPHILS 71 32 - 75 %    LYMPHOCYTES 18 12 - 49 %    MONOCYTES 9 5 - 13 %    EOSINOPHILS 2 0 - 7 %    BASOPHILS 0 0 - 1 %    IMMATURE GRANULOCYTES 1 (H) 0.0 - 0.5 %    ABS. NEUTROPHILS 8.4 (H) 1.8 - 8.0 K/UL    ABS. LYMPHOCYTES 2.2 0.8 - 3.5 K/UL    ABS. MONOCYTES 1.1 (H) 0.0 - 1.0 K/UL    ABS. EOSINOPHILS 0.2 0.0 - 0.4 K/UL    ABS. BASOPHILS 0.0 0.0 - 0.1 K/UL    ABS. IMM. GRANS. 0.1 (H) 0.00 - 0.04 K/UL    DF AUTOMATED     METABOLIC PANEL, COMPREHENSIVE    Collection Time: 10/03/18  9:08 PM   Result Value Ref Range    Sodium 136 136 - 145 mmol/L    Potassium 3.5 3.5 - 5.1 mmol/L    Chloride 106 97 - 108 mmol/L    CO2 23 21 - 32 mmol/L    Anion gap 7 5 - 15 mmol/L    Glucose 75 65 - 100 mg/dL    BUN 4 (L) 6 - 20 MG/DL    Creatinine 0.47 (L) 0.55 - 1.02 MG/DL    BUN/Creatinine ratio 9 (L) 12 - 20      GFR est AA >60 >60 ml/min/1.73m2    GFR est non-AA >60 >60 ml/min/1.73m2    Calcium 9.0 8.5 - 10.1 MG/DL    Bilirubin, total 0.3 0.2 - 1.0 MG/DL    ALT (SGPT) 20 12 - 78 U/L    AST (SGOT) 19 15 - 37 U/L    Alk.  phosphatase 116 45 - 117 U/L    Protein, total 6.8 6.4 - 8.2 g/dL    Albumin 2.9 (L) 3.5 - 5.0 g/dL    Globulin 3.9 2.0 - 4.0 g/dL    A-G Ratio 0.7 (L) 1.1 - 2.2     NT-PRO BNP    Collection Time: 10/03/18  9:08 PM   Result Value Ref Range    NT pro- (H) 0 - 125 PG/ML   TROPONIN I    Collection Time: 10/03/18  9:08 PM   Result Value Ref Range    Troponin-I, Qt. <0.05 <0.05 ng/mL   D DIMER    Collection Time: 10/03/18  9:08 PM   Result Value Ref Range    D-dimer 0.70 (H) 0.00 - 0.65 mg/L FEU   URINALYSIS W/ RFLX MICROSCOPIC    Collection Time: 10/03/18  9:08 PM   Result Value Ref Range    Color YELLOW/STRAW      Appearance CLEAR CLEAR      Specific gravity 1.007 1.003 - 1.030      pH (UA) 7.5 5.0 - 8.0      Protein NEGATIVE  NEG mg/dL    Glucose NEGATIVE  NEG mg/dL    Ketone NEGATIVE NEG mg/dL    Bilirubin NEGATIVE  NEG      Blood NEGATIVE  NEG      Urobilinogen 0.2 0.2 - 1.0 EU/dL    Nitrites NEGATIVE  NEG      Leukocyte Esterase NEGATIVE  NEG         Radiologic Studies -   No orders to display     Medical Decision Making   I am the first provider for this patient. I reviewed the vital signs, available nursing notes, past medical history, past surgical history, family history and social history. Vital Signs-Reviewed the patient's vital signs. Patient Vitals for the past 12 hrs:   Temp Pulse Resp BP SpO2   10/03/18 2145 - 73 14 139/90 95 %   10/03/18 2130 - 64 17 135/87 95 %   10/03/18 2115 - 65 19 146/90 96 %   10/03/18 2105 - - - - 97 %   10/03/18 2103 - - - (!) 151/91 -   10/03/18 2036 98 °F (36.7 °C) 87 14 (!) 165/102 99 %     Pulse Oximetry Analysis - 95% on RA    Cardiac Monitor:   Rate: 66 bpm  Rhythm: Normal Sinus Rhythm     EKG interpretation: (Preliminary) 20:41  Rhythm: normal sinus rhythm; and regular . Rate (approx.): 66 bpm; Axis: normal; AK interval: normal; QRS interval: normal ; ST/T wave: normal; Other findings: normal.  Written by Reinaldo Duffy ED Scribe, as dictated by Summer Santos MD.    Records Reviewed: Nursing Notes, Old Medical Records and Previous Laboratory Studies    Provider Notes (Medical Decision Making): Hypertensive female without obvious fluid overload presenting with CP, SOB progressively worsening. RR and HR as well as saturation and EKG normal; low suspicion PE and ACS. ED Course:   Initial assessment performed. The patients presenting problems have been discussed, and they are in agreement with the care plan formulated and outlined with them. I have encouraged them to ask questions as they arise throughout their visit. 20:45 Consult with OB hospitalist regarding care and blood pressure. He requests her evaluation in the ED and OB can see her down here for monitoring.  Requests repeat measurements of BP prior to starting medications. 20:55 Patient noting intermittent cramping but denies any fluid leaking. 22:15 Troponin negative and ddimer appropriate for this stage of pregnancy. Patient cleared from an ED perspective. Await OB recommendations as BP remains slightly elevated. Critical Care Time:   0    Disposition:  Transfer Note:  Pt is being transferred to L&D for admission. PLAN:  1. Admit to L&D  Diagnosis     Clinical Impression:   1.  Chest pain, unspecified type

## 2018-10-04 NOTE — DISCHARGE INSTRUCTIONS
Follow up with your provider as scheduled. Weeks 34 to 36 of Your Pregnancy: Care Instructions  Your Care Instructions    By now, your baby and your belly have grown quite large. It is almost time to give birth. A full-term pregnancy can deliver between 37 and 42 weeks. Your baby's lungs are almost ready to breathe air. The bones in your baby's head are now firm enough to protect it, but soft enough to move down through the birth canal.  You may feel excited, happy, anxious, or scared. You may wonder how you will know if you are in labor or what to expect during labor. Try to be flexible in your expectations of the birth. Because each birth is different, there is no way to know exactly what childbirth will be like for you. This care sheet will help you know what to expect and how to prepare. This may make your childbirth easier. If you haven't already had the Tdap shot during this pregnancy, talk to your doctor about getting it. It will help protect your  against pertussis infection. In the 36th week, most women have a test for group B streptococcus (GBS). GBS is a common bacteria that can live in the vagina and rectum. It can make your baby sick after birth. If you test positive, you will get antibiotics during labor. The medicine will keep your baby from getting the bacteria. Follow-up care is a key part of your treatment and safety. Be sure to make and go to all appointments, and call your doctor if you are having problems. It's also a good idea to know your test results and keep a list of the medicines you take. How can you care for yourself at home? Learn about pain relief choices  · Pain is different for every woman. Talk with your doctor about your feelings about pain. · You can choose from several types of pain relief. These include medicine or breathing techniques, as well as comfort measures. You can use more than one option.   · If you choose to have pain medicine during labor, talk to your doctor about your options. Some medicines lower anxiety and help with some of the pain. Others make your lower body numb so that you won't feel pain. · Be sure to tell your doctor about your pain medicine choice before you start labor or very early in your labor. You may be able to change your mind as labor progresses. · Rarely, a woman is put to sleep by medicine given through a mask or an IV. Labor and delivery  · The first stage of labor has three parts: early, active, and transition. ¨ Most women have early labor at home. You can stay busy or rest, eat light snacks, drink clear fluids, and start counting contractions. ¨ When talking during a contraction gets hard, you may be moving to active labor. During active labor, you should head for the hospital if you are not there already. ¨ You are in active labor when contractions come every 3 to 4 minutes and last about 60 seconds. Your cervix is opening more rapidly. ¨ If your water breaks, contractions will come faster and stronger. ¨ During transition, your cervix is stretching, and contractions are coming more rapidly. ¨ You may want to push, but your cervix might not be ready. Your doctor will tell you when to push. · The second stage starts when your cervix is completely opened and you are ready to push. ¨ Contractions are very strong to push the baby down the birth canal.  ¨ You will feel the urge to push. You may feel like you need to have a bowel movement. ¨ You may be coached to push with contractions. These contractions will be very strong, but you will not have them as often. You can get a little rest between contractions. ¨ You may be emotional and irritable. You may not be aware of what is going on around you. ¨ One last push, and your baby is born. · The third stage is when a few more contractions push out the placenta. This may take 30 minutes or less. · The fourth stage is the welcome recovery.  You may feel overwhelmed with emotions and exhausted but alert. This is a good time to start breastfeeding. Where can you learn more? Go to http://mason-lucy.info/. Enter M872 in the search box to learn more about \"Weeks 34 to 36 of Your Pregnancy: Care Instructions. \"  Current as of: November 21, 2017  Content Version: 11.8  © 7811-1771 [employer]. Care instructions adapted under license by [employer] (which disclaims liability or warranty for this information). If you have questions about a medical condition or this instruction, always ask your healthcare professional. [address] any warranty or liability for your use of this information.

## 2018-10-05 LAB — GRBS, EXTERNAL: POSITIVE

## 2018-10-06 LAB — PROT C AG PPP IA-ACNC: 153 % (ref 60–150)

## 2018-10-16 ENCOUNTER — HOSPITAL ENCOUNTER (INPATIENT)
Age: 33
LOS: 3 days | Discharge: HOME | DRG: 560 | End: 2018-10-21
Attending: OBSTETRICS & GYNECOLOGY | Admitting: OBSTETRICS & GYNECOLOGY
Payer: MEDICAID

## 2018-10-16 DIAGNOSIS — R52 POSTPARTUM PAIN: Primary | ICD-10-CM

## 2018-10-16 PROBLEM — O14.03 MILD PREECLAMPSIA, THIRD TRIMESTER: Status: ACTIVE | Noted: 2018-10-16

## 2018-10-16 LAB
ALBUMIN SERPL-MCNC: 2.7 G/DL (ref 3.5–5)
ALBUMIN/GLOB SERPL: 0.6 {RATIO} (ref 1.1–2.2)
ALP SERPL-CCNC: 133 U/L (ref 45–117)
ALT SERPL-CCNC: 21 U/L (ref 12–78)
ANION GAP SERPL CALC-SCNC: 10 MMOL/L (ref 5–15)
AST SERPL-CCNC: 16 U/L (ref 15–37)
BASOPHILS # BLD: 0 K/UL (ref 0–0.1)
BASOPHILS NFR BLD: 0 % (ref 0–1)
BILIRUB SERPL-MCNC: 0.3 MG/DL (ref 0.2–1)
BUN SERPL-MCNC: 5 MG/DL (ref 6–20)
BUN/CREAT SERPL: 10 (ref 12–20)
CALCIUM SERPL-MCNC: 8.9 MG/DL (ref 8.5–10.1)
CHLORIDE SERPL-SCNC: 105 MMOL/L (ref 97–108)
CO2 SERPL-SCNC: 21 MMOL/L (ref 21–32)
CREAT SERPL-MCNC: 0.51 MG/DL (ref 0.55–1.02)
CREAT UR-MCNC: 104 MG/DL
DIFFERENTIAL METHOD BLD: ABNORMAL
EOSINOPHIL # BLD: 0.1 K/UL (ref 0–0.4)
EOSINOPHIL NFR BLD: 1 % (ref 0–7)
ERYTHROCYTE [DISTWIDTH] IN BLOOD BY AUTOMATED COUNT: 12.7 % (ref 11.5–14.5)
GLOBULIN SER CALC-MCNC: 4.2 G/DL (ref 2–4)
GLUCOSE BLD STRIP.AUTO-MCNC: 97 MG/DL (ref 65–100)
GLUCOSE SERPL-MCNC: 110 MG/DL (ref 65–100)
HCT VFR BLD AUTO: 33.7 % (ref 35–47)
HGB BLD-MCNC: 12.1 G/DL (ref 11.5–16)
IMM GRANULOCYTES # BLD: 0.1 K/UL (ref 0–0.04)
IMM GRANULOCYTES NFR BLD AUTO: 0 % (ref 0–0.5)
LDH SERPL L TO P-CCNC: 137 U/L (ref 81–246)
LYMPHOCYTES # BLD: 1.8 K/UL (ref 0.8–3.5)
LYMPHOCYTES NFR BLD: 15 % (ref 12–49)
MCH RBC QN AUTO: 32.8 PG (ref 26–34)
MCHC RBC AUTO-ENTMCNC: 35.9 G/DL (ref 30–36.5)
MCV RBC AUTO: 91.3 FL (ref 80–99)
MONOCYTES # BLD: 0.6 K/UL (ref 0–1)
MONOCYTES NFR BLD: 5 % (ref 5–13)
NEUTS SEG # BLD: 9.1 K/UL (ref 1.8–8)
NEUTS SEG NFR BLD: 78 % (ref 32–75)
NRBC # BLD: 0 K/UL (ref 0–0.01)
NRBC BLD-RTO: 0 PER 100 WBC
PLATELET # BLD AUTO: 268 K/UL (ref 150–400)
PMV BLD AUTO: 10.8 FL (ref 8.9–12.9)
POTASSIUM SERPL-SCNC: 3.4 MMOL/L (ref 3.5–5.1)
PROT SERPL-MCNC: 6.9 G/DL (ref 6.4–8.2)
PROT UR-MCNC: 42 MG/DL (ref 0–11.9)
PROT/CREAT UR-RTO: 0.4
RBC # BLD AUTO: 3.69 M/UL (ref 3.8–5.2)
SERVICE CMNT-IMP: NORMAL
SODIUM SERPL-SCNC: 136 MMOL/L (ref 136–145)
URATE SERPL-MCNC: 3.8 MG/DL (ref 2.6–6)
WBC # BLD AUTO: 11.7 K/UL (ref 3.6–11)

## 2018-10-16 PROCEDURE — 85025 COMPLETE CBC W/AUTO DIFF WBC: CPT | Performed by: OBSTETRICS & GYNECOLOGY

## 2018-10-16 PROCEDURE — 74011250637 HC RX REV CODE- 250/637: Performed by: OBSTETRICS & GYNECOLOGY

## 2018-10-16 PROCEDURE — 84550 ASSAY OF BLOOD/URIC ACID: CPT | Performed by: OBSTETRICS & GYNECOLOGY

## 2018-10-16 PROCEDURE — 84156 ASSAY OF PROTEIN URINE: CPT | Performed by: OBSTETRICS & GYNECOLOGY

## 2018-10-16 PROCEDURE — 59025 FETAL NON-STRESS TEST: CPT

## 2018-10-16 PROCEDURE — 36415 COLL VENOUS BLD VENIPUNCTURE: CPT | Performed by: OBSTETRICS & GYNECOLOGY

## 2018-10-16 PROCEDURE — 82962 GLUCOSE BLOOD TEST: CPT

## 2018-10-16 PROCEDURE — 4A0HXCZ MEASUREMENT OF PRODUCTS OF CONCEPTION, CARDIAC RATE, EXTERNAL APPROACH: ICD-10-PCS | Performed by: OBSTETRICS & GYNECOLOGY

## 2018-10-16 PROCEDURE — 83615 LACTATE (LD) (LDH) ENZYME: CPT | Performed by: OBSTETRICS & GYNECOLOGY

## 2018-10-16 PROCEDURE — 99218 HC RM OBSERVATION: CPT

## 2018-10-16 PROCEDURE — 80053 COMPREHEN METABOLIC PANEL: CPT | Performed by: OBSTETRICS & GYNECOLOGY

## 2018-10-16 RX ORDER — ESCITALOPRAM OXALATE 10 MG/1
20 TABLET ORAL DAILY
Status: DISCONTINUED | OUTPATIENT
Start: 2018-10-16 | End: 2018-10-21 | Stop reason: HOSPADM

## 2018-10-16 RX ORDER — SODIUM CHLORIDE 0.9 % (FLUSH) 0.9 %
5-10 SYRINGE (ML) INJECTION EVERY 8 HOURS
Status: DISCONTINUED | OUTPATIENT
Start: 2018-10-16 | End: 2018-10-21 | Stop reason: HOSPADM

## 2018-10-16 RX ORDER — SODIUM CHLORIDE 0.9 % (FLUSH) 0.9 %
5-10 SYRINGE (ML) INJECTION AS NEEDED
Status: DISCONTINUED | OUTPATIENT
Start: 2018-10-16 | End: 2018-10-21 | Stop reason: HOSPADM

## 2018-10-16 RX ORDER — VALACYCLOVIR HYDROCHLORIDE 500 MG/1
1000 TABLET, FILM COATED ORAL DAILY
COMMUNITY
End: 2018-10-21

## 2018-10-16 RX ORDER — VALACYCLOVIR HYDROCHLORIDE 500 MG/1
500 TABLET, FILM COATED ORAL EVERY 12 HOURS
Status: DISCONTINUED | OUTPATIENT
Start: 2018-10-16 | End: 2018-10-21 | Stop reason: HOSPADM

## 2018-10-16 RX ADMIN — ESCITALOPRAM OXALATE 20 MG: 10 TABLET ORAL at 19:49

## 2018-10-16 RX ADMIN — Medication 10 ML: at 19:50

## 2018-10-16 RX ADMIN — VALACYCLOVIR HYDROCHLORIDE 500 MG: 500 TABLET, FILM COATED ORAL at 19:49

## 2018-10-16 NOTE — H&P
Obstetrics Admission History & Physical    Name: Radha Brenner MRN: [id number]  SSN: [id number]    YOB: 1985  Age: 34 y.o. Sex: female      Subjective:     Reason for Admission:  Pregnancy and Preeclampsia    History of Present Illness: Radha Brenner is a 34 y.o.  female with an estimated gestational age of 37w2d with Estimated Date of Delivery: 18. Patient complains of DOP along with severe range HTN today in the office. Pregnancy has been complicated by diabetes - gestational and preeclampsia. Patient denies headache , right upper quadrant pain  , vaginal bleeding , vaginal leaking of fluid  and visual disturbances. OB History      Para Term  AB Living    2 0   1     SAB TAB Ectopic Molar Multiple Live Births    1             Past Medical History:   Diagnosis Date    Essential hypertension     pre HTN for the past 2 years, No meds    Gestational diabetes     No Meds, Diet controlled    Headache     Psychiatric disorder     Anxiety    Vitamin D deficiency          Past Surgical History:   Procedure Laterality Date    BREAST SURGERY PROCEDURE UNLISTED      bilat breast reduction    HX GYN      HX HEENT      tonsillectomy    HX OTHER SURGICAL      27 yo     Social History     Social History    Marital status: SINGLE     Spouse name: N/A    Number of children: N/A    Years of education: N/A     Occupational History    Not on file.      Social History Main Topics    Smoking status: Current Some Day Smoker     Packs/day: 0.50    Smokeless tobacco: Never Used    Alcohol use No      Comment: rare since Dec 2014    Drug use: No    Sexual activity: Not Currently     Partners: Male     Other Topics Concern    Not on file     Social History Narrative     Family History   Problem Relation Age of Onset    Hypertension Father     Heart Disease Maternal Aunt     Cancer Maternal Uncle     Cancer Paternal Uncle     Heart Disease Paternal Uncle     Heart Disease Maternal Grandmother     Heart Disease Paternal Grandmother     Heart Disease Paternal Grandfather      Allergies   Allergen Reactions    Banana Itching    Carrot Itching    Peach (Prunus Persica) Itching    Tree Nut Itching    Watermelon Itching    Zoloft [Sertraline] Rash     Prior to Admission medications    Medication Sig Start Date End Date Taking? Authorizing Provider   valACYclovir (VALTREX) 500 mg tablet Take 1,000 mg by mouth daily. Yes Historical Provider   escitalopram oxalate (LEXAPRO) 10 mg tablet Take 20 mg by mouth daily. Yes Historical Provider        Review of Systems:  A comprehensive review of systems was negative except for that written in the History of Present Illness. Objective:     Vitals:  Blood pressure (!) 141/97, pulse 72, temperature 98.3 °F (36.8 °C), resp. rate 16, height 5' 6\" (1.676 m), weight 88 kg (194 lb), SpO2 97 %. Temp (24hrs), Av.3 °F (36.8 °C), Min:98.3 °F (36.8 °C), Max:98.3 °F (36.8 °C)    BP  Min: 137/96  Max: 149/103     Physical Exam:  Lung: clear to auscultation throughout lung fields, no wheezes, no rales, no rhonchi and normal respiratory effort  Abdomen: soft,, mild diffuse tenderness, no focal tenderness, no rebound or guarding.     Fundus: soft and non tender       Membranes:  Intact    Uterine Activity:  Infrequent ctx    Fetal Heart Rate:  Reactive       Labs:   Recent Results (from the past 24 hour(s))   CBC WITH AUTOMATED DIFF    Collection Time: 10/16/18  4:12 PM   Result Value Ref Range    WBC 11.7 (H) 3.6 - 11.0 K/uL    RBC 3.69 (L) 3.80 - 5.20 M/uL    HGB 12.1 11.5 - 16.0 g/dL    HCT 33.7 (L) 35.0 - 47.0 %    MCV 91.3 80.0 - 99.0 FL    MCH 32.8 26.0 - 34.0 PG    MCHC 35.9 30.0 - 36.5 g/dL    RDW 12.7 11.5 - 14.5 %    PLATELET 233 370 - 926 K/uL    MPV 10.8 8.9 - 12.9 FL    NRBC 0.0 0  WBC    ABSOLUTE NRBC 0.00 0.00 - 0.01 K/uL    NEUTROPHILS 78 (H) 32 - 75 %    LYMPHOCYTES 15 12 - 49 %    MONOCYTES 5 5 - 13 % EOSINOPHILS 1 0 - 7 %    BASOPHILS 0 0 - 1 %    IMMATURE GRANULOCYTES 0 0.0 - 0.5 %    ABS. NEUTROPHILS 9.1 (H) 1.8 - 8.0 K/UL    ABS. LYMPHOCYTES 1.8 0.8 - 3.5 K/UL    ABS. MONOCYTES 0.6 0.0 - 1.0 K/UL    ABS. EOSINOPHILS 0.1 0.0 - 0.4 K/UL    ABS. BASOPHILS 0.0 0.0 - 0.1 K/UL    ABS. IMM. GRANS. 0.1 (H) 0.00 - 0.04 K/UL    DF AUTOMATED     METABOLIC PANEL, COMPREHENSIVE    Collection Time: 10/16/18  4:12 PM   Result Value Ref Range    Sodium 136 136 - 145 mmol/L    Potassium 3.4 (L) 3.5 - 5.1 mmol/L    Chloride 105 97 - 108 mmol/L    CO2 21 21 - 32 mmol/L    Anion gap 10 5 - 15 mmol/L    Glucose 110 (H) 65 - 100 mg/dL    BUN 5 (L) 6 - 20 MG/DL    Creatinine 0.51 (L) 0.55 - 1.02 MG/DL    BUN/Creatinine ratio 10 (L) 12 - 20      GFR est AA >60 >60 ml/min/1.73m2    GFR est non-AA >60 >60 ml/min/1.73m2    Calcium 8.9 8.5 - 10.1 MG/DL    Bilirubin, total 0.3 0.2 - 1.0 MG/DL    ALT (SGPT) 21 12 - 78 U/L    AST (SGOT) 16 15 - 37 U/L    Alk. phosphatase 133 (H) 45 - 117 U/L    Protein, total 6.9 6.4 - 8.2 g/dL    Albumin 2.7 (L) 3.5 - 5.0 g/dL    Globulin 4.2 (H) 2.0 - 4.0 g/dL    A-G Ratio 0.6 (L) 1.1 - 2.2     LD    Collection Time: 10/16/18  4:12 PM   Result Value Ref Range     81 - 246 U/L   URIC ACID    Collection Time: 10/16/18  4:12 PM   Result Value Ref Range    Uric acid 3.8 2.6 - 6.0 MG/DL   PROTEIN/CREATININE RATIO, URINE    Collection Time: 10/16/18  5:02 PM   Result Value Ref Range    Protein, urine random 42 (H) 0.0 - 11.9 mg/dL    Creatinine, urine 104.00 mg/dL    Protein/Creat. urine Ratio 0.4         Assessment and Plan:     33 yo  at 39 4/7 with known mild pre-e and gestational diabetes which is well controlled on diet. In the office today c/o elevated BPs and was found to have severe range BPs. Since she has arrived on L&D, her BPs have corrected into the mild range. FHR tracing is reactive and BPP in the office was 8/8. Last growth was  and was 49%ile.     Will admit for observation and serial BPs. Would hold on delivery without evidence of severe disease.       Signed By:  Sarahy Jamison MD     October 16, 2018

## 2018-10-16 NOTE — PROGRESS NOTES
1915: Bedside report received from [facility]. Care assumed at this time. NST reactive. EFM removed at this time. 1930: Upon assessment, pt states she takes Lexapro 20 mg PO daily and Valtrex 500 mg BID. Medications ordered per Dr. Michelle Page. 0000: VS performed. PT sleeping, no needs at this time. 0400: VS performed and labs drawn, no needs at this time. 0184: Placed on EFM for NST.     Bedside shift change report given to KISHORE Rodriguez RN by RAIZA Gates RN. Care turned over at this time. Hourly Rounding performed by ZI.   Moraima Huber RN

## 2018-10-16 NOTE — PROGRESS NOTES
Pt arrived from office for JESUS ramos.  Pt is a , 36w4d. Pt place on monitor, serial BP's being taken. Blood drawn and sent to lab.

## 2018-10-17 LAB
ALBUMIN SERPL-MCNC: 2.3 G/DL (ref 3.5–5)
ALBUMIN/GLOB SERPL: 0.6 {RATIO} (ref 1.1–2.2)
ALP SERPL-CCNC: 116 U/L (ref 45–117)
ALT SERPL-CCNC: 19 U/L (ref 12–78)
ANION GAP SERPL CALC-SCNC: 10 MMOL/L (ref 5–15)
AST SERPL-CCNC: 17 U/L (ref 15–37)
BASOPHILS # BLD: 0 K/UL (ref 0–0.1)
BASOPHILS NFR BLD: 0 % (ref 0–1)
BILIRUB DIRECT SERPL-MCNC: <0.1 MG/DL (ref 0–0.2)
BILIRUB SERPL-MCNC: 0.2 MG/DL (ref 0.2–1)
BUN SERPL-MCNC: 5 MG/DL (ref 6–20)
BUN/CREAT SERPL: 11 (ref 12–20)
CALCIUM SERPL-MCNC: 8.4 MG/DL (ref 8.5–10.1)
CHLORIDE SERPL-SCNC: 107 MMOL/L (ref 97–108)
CO2 SERPL-SCNC: 21 MMOL/L (ref 21–32)
CREAT SERPL-MCNC: 0.44 MG/DL (ref 0.55–1.02)
DIFFERENTIAL METHOD BLD: ABNORMAL
EOSINOPHIL # BLD: 0.2 K/UL (ref 0–0.4)
EOSINOPHIL NFR BLD: 2 % (ref 0–7)
ERYTHROCYTE [DISTWIDTH] IN BLOOD BY AUTOMATED COUNT: 12.5 % (ref 11.5–14.5)
GLOBULIN SER CALC-MCNC: 3.6 G/DL (ref 2–4)
GLUCOSE BLD STRIP.AUTO-MCNC: 119 MG/DL (ref 65–100)
GLUCOSE BLD STRIP.AUTO-MCNC: 129 MG/DL (ref 65–100)
GLUCOSE BLD STRIP.AUTO-MCNC: 147 MG/DL (ref 65–100)
GLUCOSE SERPL-MCNC: 75 MG/DL (ref 65–100)
HCT VFR BLD AUTO: 30.4 % (ref 35–47)
HGB BLD-MCNC: 10.9 G/DL (ref 11.5–16)
IMM GRANULOCYTES # BLD: 0.1 K/UL (ref 0–0.04)
IMM GRANULOCYTES NFR BLD AUTO: 0 % (ref 0–0.5)
LDH SERPL L TO P-CCNC: 138 U/L (ref 81–246)
LYMPHOCYTES # BLD: 2.8 K/UL (ref 0.8–3.5)
LYMPHOCYTES NFR BLD: 25 % (ref 12–49)
MCH RBC QN AUTO: 33 PG (ref 26–34)
MCHC RBC AUTO-ENTMCNC: 35.9 G/DL (ref 30–36.5)
MCV RBC AUTO: 92.1 FL (ref 80–99)
MONOCYTES # BLD: 0.8 K/UL (ref 0–1)
MONOCYTES NFR BLD: 7 % (ref 5–13)
NEUTS SEG # BLD: 7.5 K/UL (ref 1.8–8)
NEUTS SEG NFR BLD: 66 % (ref 32–75)
NRBC # BLD: 0 K/UL (ref 0–0.01)
NRBC BLD-RTO: 0 PER 100 WBC
PLATELET # BLD AUTO: 251 K/UL (ref 150–400)
PMV BLD AUTO: 10.8 FL (ref 8.9–12.9)
POTASSIUM SERPL-SCNC: 3.4 MMOL/L (ref 3.5–5.1)
PROT SERPL-MCNC: 5.9 G/DL (ref 6.4–8.2)
RBC # BLD AUTO: 3.3 M/UL (ref 3.8–5.2)
SERVICE CMNT-IMP: ABNORMAL
SODIUM SERPL-SCNC: 138 MMOL/L (ref 136–145)
URATE SERPL-MCNC: 3.7 MG/DL (ref 2.6–6)
WBC # BLD AUTO: 11.5 K/UL (ref 3.6–11)

## 2018-10-17 PROCEDURE — 36415 COLL VENOUS BLD VENIPUNCTURE: CPT | Performed by: OBSTETRICS & GYNECOLOGY

## 2018-10-17 PROCEDURE — 80076 HEPATIC FUNCTION PANEL: CPT | Performed by: OBSTETRICS & GYNECOLOGY

## 2018-10-17 PROCEDURE — 80048 BASIC METABOLIC PNL TOTAL CA: CPT | Performed by: OBSTETRICS & GYNECOLOGY

## 2018-10-17 PROCEDURE — 82962 GLUCOSE BLOOD TEST: CPT

## 2018-10-17 PROCEDURE — 83615 LACTATE (LD) (LDH) ENZYME: CPT | Performed by: OBSTETRICS & GYNECOLOGY

## 2018-10-17 PROCEDURE — 99218 HC RM OBSERVATION: CPT

## 2018-10-17 PROCEDURE — 59025 FETAL NON-STRESS TEST: CPT

## 2018-10-17 PROCEDURE — 85025 COMPLETE CBC W/AUTO DIFF WBC: CPT | Performed by: OBSTETRICS & GYNECOLOGY

## 2018-10-17 PROCEDURE — 84550 ASSAY OF BLOOD/URIC ACID: CPT | Performed by: OBSTETRICS & GYNECOLOGY

## 2018-10-17 PROCEDURE — 77030032490 HC SLV COMPR SCD KNE COVD -B

## 2018-10-17 PROCEDURE — 74011250637 HC RX REV CODE- 250/637: Performed by: OBSTETRICS & GYNECOLOGY

## 2018-10-17 RX ADMIN — ESCITALOPRAM OXALATE 20 MG: 10 TABLET ORAL at 20:07

## 2018-10-17 RX ADMIN — VALACYCLOVIR HYDROCHLORIDE 500 MG: 500 TABLET, FILM COATED ORAL at 09:04

## 2018-10-17 RX ADMIN — Medication 10 ML: at 20:07

## 2018-10-17 RX ADMIN — VALACYCLOVIR HYDROCHLORIDE 500 MG: 500 TABLET, FILM COATED ORAL at 20:07

## 2018-10-17 RX ADMIN — Medication 10 ML: at 04:00

## 2018-10-17 NOTE — PROGRESS NOTES
1245- pt c/o pain in left calf and right thigh last night. No tenderness, redness or swelling noted.      1080- dr. Samuel Escort notified of pt's c/o pain in calf, received verbal order for scd's    [id number]- scd's given and explained to pt

## 2018-10-17 NOTE — PROGRESS NOTES
1915: Bedside report received from DARRYL Thomas RN. Care assumed at this time. 1930: Dr. Jorgito Mora advised of . No orders received at this time. Will see what fasting is in the morning. 0720: Bedside shift change report given to VINCE Mohan RN by RAIZA Rivas RN. Care turned over at this time. Hourly Rounding performed by RN.   Aliya Hunter RN

## 2018-10-17 NOTE — PROGRESS NOTES
Spiritual Care Assessment/Progress Note  [facility]      NAME: Santosh Dash      MRN: [id number]  AGE: 34 y.o.  SEX: female  Mandaeism Affiliation: Brandoni   Language: English     10/17/2018     Total Time (in minutes): 77     Spiritual Assessment begun in [facility] LABOR & DELIVERY through conversation with:         [x]Patient        [] Family    [] Friend(s)        Reason for Consult: Initial/Spiritual assessment, patient floor, Request by patient     Spiritual beliefs: (Please include comment if needed)     [x] Identifies with a ronnie tradition: Holiness        [x] Supported by a ronnie community:    YesGraph        [] Claims no spiritual orientation:           [] Seeking spiritual identity:                [] Adheres to an individual form of spirituality:           [] Not able to assess:                           Identified resources for coping:      [x] Prayer                               [x] Music                  [] Guided Imagery     [x] Family/friends                 [] Pet visits     [] Devotional reading                         [] Unknown     [] Other:                                            Interventions offered during this visit: (See comments for more details)    Patient Interventions: Affirmation of emotions/emotional suffering, Affirmation of ronnie, Bridging, Catharsis/review of pertinent events in supportive environment, Iconic (affirming the presence of God/Higher Power), Normalization of emotional/spiritual concerns, Prayer (assurance of)           Plan of Care:     [x] Support spiritual and/or cultural needs    [] Support AMD and/or advance care planning process      [] Support grieving process   [] Coordinate Rites and/or Rituals    [] Coordination with community clergy   [] No spiritual needs identified at this time   [] Detailed Plan of Care below (See Comments)  [] Make referral to Music Therapy  [] Make referral to Pet Therapy     [] Make referral to Addiction services  [] Make referral to [facility]  [] Make referral to Spiritual Care Partner  [] No future visits requested        [x] Follow up visits as needed     Comments: Responded to request to visit patient. This is first pregnancy for patient who is single mother but well-supported by family, friends and Zoroastrianism community. Patient has normal fears concerning birth process and pain, offered words of support and encouragement and encouraged patient to trust her body and to trust the staff. Provided calming presence and support; remained with patient until a family friend arrived. Assured pt of continued availability of spiritual care to provide support. Visit by: Kelly Witt.  Ren Urias D.Min, MA, [id number]    Lead  Profession Development & Advancement

## 2018-10-17 NOTE — PROGRESS NOTES
Ante Partum Progress Note    Janay Paez  36w5d    Assessment: 36w5d   Gestational diabetes --diet controlled, normal blood sugars, will monitor through labor. Pre-eclampsia, mild--bp in mild but elevated range, reassuring fetal surveillance. Pr/cr ratio 0.4. Labs normal.  Discussed starting with cervical ripening tomorrow (cervidil vs balloon)  and IOL on Friday and patient agrees with plan. Discussed small risk of prematurity but that the risk is outweighed by risk of pre-eclampsia. Plan: see above    Orders/Charges: High and Non Stress Test    Patient states she has no new complaints. Denies HA or visual changes. Had questions regarding cord blood banking that were answered and she will decide if she wants to proceed. Vitals:  Visit Vitals  /85 (BP 1 Location: Right arm, BP Patient Position: At rest)   Pulse 77   Temp 98.2 °F (36.8 °C)   Resp 16   Ht 5' 6\" (1.676 m)   Wt 88 kg (194 lb)   SpO2 98%   BMI 31.31 kg/m²     Temp (24hrs), Av.4 °F (36.9 °C), Min:98.2 °F (36.8 °C), Max:98.9 °F (37.2 °C)      Last 24hr Input/Output:  No intake or output data in the 24 hours ending 10/17/18 1010     Non stress test:  Reactive    Patient Vitals for the past 4 hrs: Mode Fetal Heart Rate Fetal Activity Variability Decelerations Accelerations RN Reviewed Strip? Non Stress Test   10/17/18 0730 External 130 Present 6-25 BPM None Yes Yes Reactive      Patient Vitals for the past 4 hrs: Mode Fetal Heart Rate Fetal Activity Variability Decelerations Accelerations RN Reviewed Strip? Non Stress Test   10/17/18 0730 External 130 Present 6-25 BPM None Yes Yes Reactive        Uterine Activity: None     Exam:  Patient without distress.      Abdomen, fundus soft non-tender     Extremities, no redness or tenderness               Additional Exam: Deferred    Labs:     Lab Results   Component Value Date/Time    WBC 11.5 (H) 10/17/2018 04:01 AM    WBC 11.7 (H) 10/16/2018 04:12 PM    WBC 12.0 (H) 10/03/2018 09:08 PM WBC 10.5 04/30/2015 12:05 AM    HGB 10.9 (L) 10/17/2018 04:01 AM    HGB 12.1 10/16/2018 04:12 PM    HGB 12.0 10/03/2018 09:08 PM    HGB 13.8 04/30/2015 12:05 AM    HCT 30.4 (L) 10/17/2018 04:01 AM    HCT 33.7 (L) 10/16/2018 04:12 PM    HCT 33.5 (L) 10/03/2018 09:08 PM    HCT 40.6 04/30/2015 12:05 AM    PLATELET 533 81/64/9678 04:01 AM    PLATELET 704 19/42/1677 04:12 PM    PLATELET 472 79/15/4267 09:08 PM    PLATELET 146 21/24/9410 12:05 AM       Recent Results (from the past 24 hour(s))   CBC WITH AUTOMATED DIFF    Collection Time: 10/16/18  4:12 PM   Result Value Ref Range    WBC 11.7 (H) 3.6 - 11.0 K/uL    RBC 3.69 (L) 3.80 - 5.20 M/uL    HGB 12.1 11.5 - 16.0 g/dL    HCT 33.7 (L) 35.0 - 47.0 %    MCV 91.3 80.0 - 99.0 FL    MCH 32.8 26.0 - 34.0 PG    MCHC 35.9 30.0 - 36.5 g/dL    RDW 12.7 11.5 - 14.5 %    PLATELET 009 656 - 161 K/uL    MPV 10.8 8.9 - 12.9 FL    NRBC 0.0 0  WBC    ABSOLUTE NRBC 0.00 0.00 - 0.01 K/uL    NEUTROPHILS 78 (H) 32 - 75 %    LYMPHOCYTES 15 12 - 49 %    MONOCYTES 5 5 - 13 %    EOSINOPHILS 1 0 - 7 %    BASOPHILS 0 0 - 1 %    IMMATURE GRANULOCYTES 0 0.0 - 0.5 %    ABS. NEUTROPHILS 9.1 (H) 1.8 - 8.0 K/UL    ABS. LYMPHOCYTES 1.8 0.8 - 3.5 K/UL    ABS. MONOCYTES 0.6 0.0 - 1.0 K/UL    ABS. EOSINOPHILS 0.1 0.0 - 0.4 K/UL    ABS. BASOPHILS 0.0 0.0 - 0.1 K/UL    ABS. IMM.  GRANS. 0.1 (H) 0.00 - 0.04 K/UL    DF AUTOMATED     METABOLIC PANEL, COMPREHENSIVE    Collection Time: 10/16/18  4:12 PM   Result Value Ref Range    Sodium 136 136 - 145 mmol/L    Potassium 3.4 (L) 3.5 - 5.1 mmol/L    Chloride 105 97 - 108 mmol/L    CO2 21 21 - 32 mmol/L    Anion gap 10 5 - 15 mmol/L    Glucose 110 (H) 65 - 100 mg/dL    BUN 5 (L) 6 - 20 MG/DL    Creatinine 0.51 (L) 0.55 - 1.02 MG/DL    BUN/Creatinine ratio 10 (L) 12 - 20      GFR est AA >60 >60 ml/min/1.73m2    GFR est non-AA >60 >60 ml/min/1.73m2    Calcium 8.9 8.5 - 10.1 MG/DL    Bilirubin, total 0.3 0.2 - 1.0 MG/DL    ALT (SGPT) 21 12 - 78 U/L    AST (SGOT) 16 15 - 37 U/L    Alk. phosphatase 133 (H) 45 - 117 U/L    Protein, total 6.9 6.4 - 8.2 g/dL    Albumin 2.7 (L) 3.5 - 5.0 g/dL    Globulin 4.2 (H) 2.0 - 4.0 g/dL    A-G Ratio 0.6 (L) 1.1 - 2.2     LD    Collection Time: 10/16/18  4:12 PM   Result Value Ref Range     81 - 246 U/L   URIC ACID    Collection Time: 10/16/18  4:12 PM   Result Value Ref Range    Uric acid 3.8 2.6 - 6.0 MG/DL   PROTEIN/CREATININE RATIO, URINE    Collection Time: 10/16/18  5:02 PM   Result Value Ref Range    Protein, urine random 42 (H) 0.0 - 11.9 mg/dL    Creatinine, urine 104.00 mg/dL    Protein/Creat. urine Ratio 0.4     GLUCOSE, POC    Collection Time: 10/16/18  8:28 PM   Result Value Ref Range    Glucose (POC) 97 65 - 100 mg/dL    Performed by [address] [place] [address], [facility]    Collection Time: 10/17/18  4:01 AM   Result Value Ref Range    Sodium 138 136 - 145 mmol/L    Potassium 3.4 (L) 3.5 - 5.1 mmol/L    Chloride 107 97 - 108 mmol/L    CO2 21 21 - 32 mmol/L    Anion gap 10 5 - 15 mmol/L    Glucose 75 65 - 100 mg/dL    BUN 5 (L) 6 - 20 MG/DL    Creatinine 0.44 (L) 0.55 - 1.02 MG/DL    BUN/Creatinine ratio 11 (L) 12 - 20      GFR est AA >60 >60 ml/min/1.73m2    GFR est non-AA >60 >60 ml/min/1.73m2    Calcium 8.4 (L) 8.5 - 10.1 MG/DL   HEPATIC FUNCTION PANEL    Collection Time: 10/17/18  4:01 AM   Result Value Ref Range    Protein, total 5.9 (L) 6.4 - 8.2 g/dL    Albumin 2.3 (L) 3.5 - 5.0 g/dL    Globulin 3.6 2.0 - 4.0 g/dL    A-G Ratio 0.6 (L) 1.1 - 2.2      Bilirubin, total 0.2 0.2 - 1.0 MG/DL    Bilirubin, direct <0.1 0.0 - 0.2 MG/DL    Alk.  phosphatase 116 45 - 117 U/L    AST (SGOT) 17 15 - 37 U/L    ALT (SGPT) 19 12 - 78 U/L   LD    Collection Time: 10/17/18  4:01 AM   Result Value Ref Range     81 - 246 U/L   URIC ACID    Collection Time: 10/17/18  4:01 AM   Result Value Ref Range    Uric acid 3.7 2.6 - 6.0 MG/DL   CBC WITH AUTOMATED DIFF    Collection Time: 10/17/18  4:01 AM   Result Value Ref Range WBC 11.5 (H) 3.6 - 11.0 K/uL    RBC 3.30 (L) 3.80 - 5.20 M/uL    HGB 10.9 (L) 11.5 - 16.0 g/dL    HCT 30.4 (L) 35.0 - 47.0 %    MCV 92.1 80.0 - 99.0 FL    MCH 33.0 26.0 - 34.0 PG    MCHC 35.9 30.0 - 36.5 g/dL    RDW 12.5 11.5 - 14.5 %    PLATELET 740 076 - 880 K/uL    MPV 10.8 8.9 - 12.9 FL    NRBC 0.0 0  WBC    ABSOLUTE NRBC 0.00 0.00 - 0.01 K/uL    NEUTROPHILS 66 32 - 75 %    LYMPHOCYTES 25 12 - 49 %    MONOCYTES 7 5 - 13 %    EOSINOPHILS 2 0 - 7 %    BASOPHILS 0 0 - 1 %    IMMATURE GRANULOCYTES 0 0.0 - 0.5 %    ABS. NEUTROPHILS 7.5 1.8 - 8.0 K/UL    ABS. LYMPHOCYTES 2.8 0.8 - 3.5 K/UL    ABS. MONOCYTES 0.8 0.0 - 1.0 K/UL    ABS. EOSINOPHILS 0.2 0.0 - 0.4 K/UL    ABS. BASOPHILS 0.0 0.0 - 0.1 K/UL    ABS. IMM.  GRANS. 0.1 (H) 0.00 - 0.04 K/UL    DF AUTOMATED     GLUCOSE, POC    Collection Time: 10/17/18  9:36 AM   Result Value Ref Range    Glucose (POC) 129 (H) 65 - 100 mg/dL    Performed by Memo Marquez

## 2018-10-18 ENCOUNTER — APPOINTMENT (OUTPATIENT)
Dept: ULTRASOUND IMAGING | Age: 33
DRG: 560 | End: 2018-10-18
Attending: OBSTETRICS & GYNECOLOGY
Payer: MEDICAID

## 2018-10-18 LAB
GLUCOSE BLD STRIP.AUTO-MCNC: 112 MG/DL (ref 65–100)
GLUCOSE BLD STRIP.AUTO-MCNC: 132 MG/DL (ref 65–100)
GLUCOSE BLD STRIP.AUTO-MCNC: 83 MG/DL (ref 65–100)
SERVICE CMNT-IMP: ABNORMAL
SERVICE CMNT-IMP: ABNORMAL
SERVICE CMNT-IMP: NORMAL

## 2018-10-18 PROCEDURE — 3E033VJ INTRODUCTION OF OTHER HORMONE INTO PERIPHERAL VEIN, PERCUTANEOUS APPROACH: ICD-10-PCS | Performed by: OBSTETRICS & GYNECOLOGY

## 2018-10-18 PROCEDURE — 99218 HC RM OBSERVATION: CPT

## 2018-10-18 PROCEDURE — 10907ZC DRAINAGE OF AMNIOTIC FLUID, THERAPEUTIC FROM PRODUCTS OF CONCEPTION, VIA NATURAL OR ARTIFICIAL OPENING: ICD-10-PCS | Performed by: OBSTETRICS & GYNECOLOGY

## 2018-10-18 PROCEDURE — 59025 FETAL NON-STRESS TEST: CPT

## 2018-10-18 PROCEDURE — 74011250637 HC RX REV CODE- 250/637: Performed by: OBSTETRICS & GYNECOLOGY

## 2018-10-18 PROCEDURE — 93971 EXTREMITY STUDY: CPT

## 2018-10-18 PROCEDURE — 75410000002 HC LABOR FEE PER 1 HR

## 2018-10-18 PROCEDURE — A4300 CATH IMPL VASC ACCESS PORTAL: HCPCS

## 2018-10-18 PROCEDURE — 65410000002 HC RM PRIVATE OB

## 2018-10-18 PROCEDURE — 77030034849

## 2018-10-18 PROCEDURE — 82962 GLUCOSE BLOOD TEST: CPT

## 2018-10-18 RX ORDER — OXYTOCIN/0.9 % SODIUM CHLORIDE 30/500 ML
0-25 PLASTIC BAG, INJECTION (ML) INTRAVENOUS
Status: DISCONTINUED | OUTPATIENT
Start: 2018-10-19 | End: 2018-10-21 | Stop reason: HOSPADM

## 2018-10-18 RX ADMIN — VALACYCLOVIR HYDROCHLORIDE 500 MG: 500 TABLET, FILM COATED ORAL at 10:02

## 2018-10-18 RX ADMIN — ESCITALOPRAM OXALATE 20 MG: 10 TABLET ORAL at 21:54

## 2018-10-18 RX ADMIN — Medication 10 ML: at 10:05

## 2018-10-18 RX ADMIN — VALACYCLOVIR HYDROCHLORIDE 500 MG: 500 TABLET, FILM COATED ORAL at 21:54

## 2018-10-18 RX ADMIN — Medication 10 ML: at 21:54

## 2018-10-18 NOTE — PROGRESS NOTES
Pt sitting up eating breakfast. Told her once done, she needs to lie back down on her side for a bit. Will perform NST then. Pt states that she has a headache now and rates 2/10. She also states slight pain in her lt calf. Not red or tender to touch. No swelling noted. Dr Caro Handley ordered a doppler study for today. Pt also states that she is wondering if she needs more lexapro because she notices when it is wearing off, she feels less focused. Will discuss with Dr Caor Handley.

## 2018-10-18 NOTE — PROGRESS NOTES
Initial Nutrition Assessment:    INTERVENTIONS/RECOMMENDATIONS:   · Meals/Snacks: General/healthful diet: Gestational diabetic diet     ASSESSMENT:   Patient medically noted for Pregnancy, Preeclampsia, and gestational DM. Plans for induction tomorrow. Currently receiving a gestational diabetic diet. Continue current diet. Advance to regular diet s/p delivery. Encourage intake of meals/fluids. Diet Order: (Gestational Diabetic diet)  % Eaten:  No data found. Pertinent Medications: [x]Reviewed []Other: lexapro  Pertinent Labs: []Reviewed []Other: lexapro  Food Allergies: []None [x]Other: banana, carrot, peach, treenut, watermelon   Last BM: 10/17  [x]Active     []Hyperactive  []Hypoactive       [] Absent BS  Skin:    [x] Intact   [] Incision  [] Breakdown: [] Edema []Other:    Anthropometrics:   Height: 5' 6\" (167.6 cm) Weight: 88 kg (194 lb)   IBW (%IBW):   ( ) UBW (%UBW):   (  %)   Last Weight Metrics:  Weight Loss Metrics 10/16/2018 10/3/2018 1/18/2016 12/2/2015 10/30/2015 10/20/2015 9/30/2015   Today's Wt 194 lb 195 lb 5.2 oz 163 lb 1.6 oz 159 lb 163 lb 162 lb 161 lb   BMI 31.31 kg/m2 31.53 kg/m2 26.95 kg/m2 25.86 kg/m2 26.32 kg/m2 26.16 kg/m2 26 kg/m2       BMI: Body mass index is 31.31 kg/m². This BMI is indicative of:   []Underweight    []Normal    []Overweight    [x] Obesity   [] Extreme Obesity (BMI>40)     Estimated Nutrition Needs (Based on):   2095 Kcals/day(BMR (1612) x 1. 3AF) , 88 g(1.0 g/kg bw) Protein  Carbohydrate:  At Least 130 g/day  Fluids: 2100 mL/day (1ml/kcal)    Pt expected to meet estimated nutrient needs: [x]Yes []No    NUTRITION DIAGNOSES:   Problem:  No nutritional diagnosis at this time      Etiology: related to       Signs/Symptoms: as evidenced by        NUTRITION INTERVENTIONS:  Meals/Snacks: General/healthful diet                  GOAL:   PO intake >75% of meals next 5-7 days    LEARNING NEEDS (Diet, Food/Nutrient-Drug Interaction):    [x] None Identified   [] Identified and Education Provided/Documented   [] Identified and Pt declined/was not appropriate     Cultural, Hinduism, OR Ethnic Dietary Needs:    [x] None Identified   [] Identified and Addressed     [x] Interdisciplinary Care Plan Reviewed/Documented    [x] Discharge Planning: Regular diet       MONITORING /EVALUATION:   Food/Nutrient Intake Outcomes:  Total energy intake  Physical Signs/Symptoms Outcomes: Weight/weight change, Glucose profile    NUTRITION RISK:    [] High              [] Moderate           [x]  Low  []  Minimal/Uncompromised    PT SEEN FOR:    []  MD Consult: []Calorie Count      []Diabetic Diet Education        []Diet Education     []Electrolyte Management     []General Nutrition Management and Supplements     []Management of Tube Feeding     []TPN Recommendations    [x]  RN Referral:  []MST score >=2     []Enteral/Parenteral Nutrition PTA     [x]Pregnant: Gestational DM or Multigestation     []Pressure Ulcer/Wound Care needs        []  Low BMI  []  MAYNOR Mancera  Pager [phone]                 Weekend Pager [phone]

## 2018-10-18 NOTE — PROGRESS NOTES
Ante Partum Progress Note    Janay Paez  36w6d    Assessment: 36w6d   Gestational diabetes --diet controlled, bp mostly at goal, with one elevated pp 147. Will continue to monitor fasting and PP until labor, and then check q 2 hours.       Pre-eclampsia, mild--bp in mild but elevated range, reassuring fetal surveillance. Pr/cr ratio 0.4. Labs normal.  Will plan cervical ripening this evening with IOL tomorrow at 37w0d. Anxiety/depression - on lexapro 20mg. Still with some depressive sxs. Will need close f/u postpartum. HSV no lesions noted    GBS pos    Rh neg    Orders/Charges: Medium    Patient states she has no new complaints. Denies HA. RUQ pain, blurry vision. Vitals:  Visit Vitals  BP (!) 139/103 (BP 1 Location: Right arm, BP Patient Position: Sitting)   Pulse 76   Temp 97.5 °F (36.4 °C)   Resp 20   Ht 5' 6\" (1.676 m)   Wt 88 kg (194 lb)   SpO2 99%   BMI 31.31 kg/m²     Temp (24hrs), Av.2 °F (36.8 °C), Min:97.5 °F (36.4 °C), Max:98.9 °F (37.2 °C)      Last 24hr Input/Output:  No intake or output data in the 24 hours ending 10/18/18 1027     Non stress test:  pending    No data found. No data found. Uterine Activity: pending     Exam:  Patient without distress.      Abdomen, fundus soft non-tender     Extremities, no redness or tenderness               Additional Exam: Deferred    Labs:     Lab Results   Component Value Date/Time    WBC 11.5 (H) 10/17/2018 04:01 AM    WBC 11.7 (H) 10/16/2018 04:12 PM    WBC 12.0 (H) 10/03/2018 09:08 PM    WBC 10.5 2015 12:05 AM    HGB 10.9 (L) 10/17/2018 04:01 AM    HGB 12.1 10/16/2018 04:12 PM    HGB 12.0 10/03/2018 09:08 PM    HGB 13.8 2015 12:05 AM    HCT 30.4 (L) 10/17/2018 04:01 AM    HCT 33.7 (L) 10/16/2018 04:12 PM    HCT 33.5 (L) 10/03/2018 09:08 PM    HCT 40.6 2015 12:05 AM    PLATELET 653  04:01 AM    PLATELET 516  04:12 PM    PLATELET 789  09:08 PM    PLATELET 277  12:05 AM       Recent Results (from the past 24 hour(s))   GLUCOSE, POC    Collection Time: 10/17/18  1:58 PM   Result Value Ref Range    Glucose (POC) 119 (H) 65 - 100 mg/dL    Performed by [employer], POC    Collection Time: 10/17/18  7:27 PM   Result Value Ref Range    Glucose (POC) 147 (H) 65 - 100 mg/dL    Performed by Sarita Garay    GLUCOSE, POC    Collection Time: 10/18/18  7:10 AM   Result Value Ref Range    Glucose (POC) 83 65 - 100 mg/dL    Performed by Jaquelin Hopkins

## 2018-10-18 NOTE — PROGRESS NOTES
In to see patient at 1645  Bps remain mild range  Asx    FHT cat I  SVE 2/60/-2    FB placed without difficulty and instilled with 60cc NS    Plan IOL in am

## 2018-10-18 NOTE — PROGRESS NOTES
Pt back from getting her doppler study completed. Pt ordered her lunch and is waiting for that to come. Will place EFM to get NST. Pt also states that her headache is gone. SCD's reapplied.

## 2018-10-18 NOTE — PROGRESS NOTES
Assumed care of  EGA 36 6/7 weeks gestation here for preeclampsia and gest. Diabetic diet controlled. Pt currently denies any c/o at this time. Pt denies ROM or vag. Bleeding. Pt has a 20g IV in rt wrist that is currently saline locked. Pt is going to be induced and will get a bansal bulb at 4pm today.

## 2018-10-18 NOTE — PROGRESS NOTES
Transport just came for patient to go get dopplers on LE. Linens changed on bed and bath linens provided for later when pt to shower.

## 2018-10-18 NOTE — PHYSICIAN ADVISORY
Letter of Status Determination:   Recommend hospitalization status upgraded from   OBSERVATION  to INPATIENT \ Status     Pt Name:  Rivera Young   MR#   [address] [facility] # [id number] /  [id number]  Payor: Cesar Juarez / [facility] / Product Type: Managed Care Medicaid /  \   JEANNETTE#  [id number]   [address] [place] [address]  @ [facility]   Hospitalization date  10/16/2018  3:45 PM   Current Attending Physician  Vasquez García MD   Principal diagnosis  Headache   Clinicals  34 y.o. y.o  female hospitalized with above diagnosis   Rivera Young is a 34 y.o.  female with an estimated gestational age of 37w2d with Estimated Date of Delivery: 11/9/18. Patient complains of DOP along with severe range HTN today in the office. Pregnancy has been complicated by diabetes - gestational and preeclampsia.  SBP around upper 140's to 150's more than 4 hours , Protein/Creatinine ratio = 0.4, admitted for induction and delivery      [place] (MCG) criteria    Delivery planned due to nonsevere preeclampsia as indicated by ALL of the following:   Nonsevere preeclampsia present as indicated by ALL of the following:   Woman at 21 or more weeks' gestation   New-onset SBP greater than or equal to 140 mm Hg but less than 160 mm Hg or DBP greater than or equal to 90 mm Hg but less than 110 mm Hg on 2 occasions at least 4 hours apart   Proteinuria present as indicated by 1 or more of the following:   Urinary protein excretion greater than or equal to 300 mg per 24-hour collection (or this amount extrapolated from a shorter timed collection)   Protein/creatinine ratio greater than or equal to 0.3 (measured in mg/dL)     STATUS DETERMINATION  INPATIENT   The final decision of the patient's hospitalization status depends on the attending physician's judgment    Additional comments     Payor: [facility] MEDICAID / Plan: [facility] / Product Type: Managed Care Medicaid /  \        Korin Ramirez Savita Schaeffer MD  Cell: [phone]  Physician Advisor

## 2018-10-18 NOTE — PROGRESS NOTES
1645 - sve by dr Rolando Gil, bansal bulb inserted with 60cc normal saline. 200 - this RN spoke with dr. Rolando Gil for orders, patient can be monitored intermittently.

## 2018-10-19 ENCOUNTER — ANESTHESIA EVENT (OUTPATIENT)
Dept: LABOR AND DELIVERY | Age: 33
DRG: 560 | End: 2018-10-19
Payer: MEDICAID

## 2018-10-19 ENCOUNTER — ANESTHESIA (OUTPATIENT)
Dept: LABOR AND DELIVERY | Age: 33
DRG: 560 | End: 2018-10-19
Payer: MEDICAID

## 2018-10-19 LAB
GLUCOSE BLD STRIP.AUTO-MCNC: 88 MG/DL (ref 65–100)
GLUCOSE BLD STRIP.AUTO-MCNC: 89 MG/DL (ref 65–100)
SERVICE CMNT-IMP: NORMAL
SERVICE CMNT-IMP: NORMAL

## 2018-10-19 PROCEDURE — 65410000002 HC RM PRIVATE OB

## 2018-10-19 PROCEDURE — 77010026065 HC OXYGEN MINIMUM MEDICAL AIR

## 2018-10-19 PROCEDURE — 74011000258 HC RX REV CODE- 258: Performed by: OBSTETRICS & GYNECOLOGY

## 2018-10-19 PROCEDURE — 82962 GLUCOSE BLOOD TEST: CPT

## 2018-10-19 PROCEDURE — 74011250637 HC RX REV CODE- 250/637: Performed by: OBSTETRICS & GYNECOLOGY

## 2018-10-19 PROCEDURE — 77030014125 HC TY EPDRL BBMI -B: Performed by: ANESTHESIOLOGY

## 2018-10-19 PROCEDURE — 74011000250 HC RX REV CODE- 250

## 2018-10-19 PROCEDURE — 77030021125

## 2018-10-19 PROCEDURE — 74011250636 HC RX REV CODE- 250/636

## 2018-10-19 PROCEDURE — 77030010848 HC CATH INTUTR PRSS KOLB -B

## 2018-10-19 PROCEDURE — 76060000078 HC EPIDURAL ANESTHESIA

## 2018-10-19 PROCEDURE — 3E0R3BZ INTRODUCTION OF ANESTHETIC AGENT INTO SPINAL CANAL, PERCUTANEOUS APPROACH: ICD-10-PCS | Performed by: ANESTHESIOLOGY

## 2018-10-19 PROCEDURE — 77030018749 HC HK AMNIO DISP DERY -A

## 2018-10-19 PROCEDURE — 75410000003 HC RECOV DEL/VAG/CSECN EA 0.5 HR

## 2018-10-19 PROCEDURE — 00HU33Z INSERTION OF INFUSION DEVICE INTO SPINAL CANAL, PERCUTANEOUS APPROACH: ICD-10-PCS | Performed by: ANESTHESIOLOGY

## 2018-10-19 PROCEDURE — A4300 CATH IMPL VASC ACCESS PORTAL: HCPCS | Performed by: ANESTHESIOLOGY

## 2018-10-19 PROCEDURE — 0UQMXZZ REPAIR VULVA, EXTERNAL APPROACH: ICD-10-PCS | Performed by: OBSTETRICS & GYNECOLOGY

## 2018-10-19 PROCEDURE — 74011250636 HC RX REV CODE- 250/636: Performed by: OBSTETRICS & GYNECOLOGY

## 2018-10-19 PROCEDURE — 75410000002 HC LABOR FEE PER 1 HR

## 2018-10-19 PROCEDURE — 0KQM0ZZ REPAIR PERINEUM MUSCLE, OPEN APPROACH: ICD-10-PCS | Performed by: OBSTETRICS & GYNECOLOGY

## 2018-10-19 PROCEDURE — 77030031139 HC SUT VCRL2 J&J -A

## 2018-10-19 PROCEDURE — 77010026064 HC OXYGEN INFANT MED AIR MIN

## 2018-10-19 PROCEDURE — 75410000000 HC DELIVERY VAGINAL/SINGLE

## 2018-10-19 RX ORDER — SODIUM CHLORIDE, SODIUM LACTATE, POTASSIUM CHLORIDE, CALCIUM CHLORIDE 600; 310; 30; 20 MG/100ML; MG/100ML; MG/100ML; MG/100ML
125 INJECTION, SOLUTION INTRAVENOUS CONTINUOUS
Status: DISCONTINUED | OUTPATIENT
Start: 2018-10-19 | End: 2018-10-19

## 2018-10-19 RX ORDER — IBUPROFEN 400 MG/1
800 TABLET ORAL EVERY 8 HOURS
Status: DISCONTINUED | OUTPATIENT
Start: 2018-10-19 | End: 2018-10-21 | Stop reason: HOSPADM

## 2018-10-19 RX ORDER — NALOXONE HYDROCHLORIDE 0.4 MG/ML
0.4 INJECTION, SOLUTION INTRAMUSCULAR; INTRAVENOUS; SUBCUTANEOUS AS NEEDED
Status: DISCONTINUED | OUTPATIENT
Start: 2018-10-19 | End: 2018-10-21 | Stop reason: HOSPADM

## 2018-10-19 RX ORDER — ACETAMINOPHEN 325 MG/1
650 TABLET ORAL
Status: DISCONTINUED | OUTPATIENT
Start: 2018-10-19 | End: 2018-10-21 | Stop reason: HOSPADM

## 2018-10-19 RX ORDER — ZOLPIDEM TARTRATE 5 MG/1
5 TABLET ORAL
Status: DISCONTINUED | OUTPATIENT
Start: 2018-10-19 | End: 2018-10-21 | Stop reason: HOSPADM

## 2018-10-19 RX ORDER — FENTANYL/BUPIVACAINE/NS/PF 2-1250MCG
1-16 PREFILLED PUMP RESERVOIR EPIDURAL CONTINUOUS
Status: DISCONTINUED | OUTPATIENT
Start: 2018-10-19 | End: 2018-10-19

## 2018-10-19 RX ORDER — OXYCODONE AND ACETAMINOPHEN 5; 325 MG/1; MG/1
1 TABLET ORAL
Status: DISCONTINUED | OUTPATIENT
Start: 2018-10-19 | End: 2018-10-21 | Stop reason: HOSPADM

## 2018-10-19 RX ORDER — BUPIVACAINE HYDROCHLORIDE AND EPINEPHRINE 2.5; 5 MG/ML; UG/ML
INJECTION, SOLUTION EPIDURAL; INFILTRATION; INTRACAUDAL; PERINEURAL AS NEEDED
Status: DISCONTINUED | OUTPATIENT
Start: 2018-10-19 | End: 2018-10-19 | Stop reason: HOSPADM

## 2018-10-19 RX ORDER — ESCITALOPRAM OXALATE 10 MG/1
20 TABLET ORAL DAILY
Status: DISCONTINUED | OUTPATIENT
Start: 2018-10-20 | End: 2018-10-19 | Stop reason: SDUPTHER

## 2018-10-19 RX ORDER — BUPIVACAINE HYDROCHLORIDE AND EPINEPHRINE 2.5; 5 MG/ML; UG/ML
INJECTION, SOLUTION EPIDURAL; INFILTRATION; INTRACAUDAL; PERINEURAL
Status: COMPLETED
Start: 2018-10-19 | End: 2018-10-19

## 2018-10-19 RX ORDER — HYDROCORTISONE ACETATE PRAMOXINE HCL 2.5; 1 G/100G; G/100G
CREAM TOPICAL AS NEEDED
Status: DISCONTINUED | OUTPATIENT
Start: 2018-10-19 | End: 2018-10-21 | Stop reason: HOSPADM

## 2018-10-19 RX ORDER — FENTANYL/BUPIVACAINE/NS/PF 2-1250MCG
PREFILLED PUMP RESERVOIR EPIDURAL
Status: COMPLETED
Start: 2018-10-19 | End: 2018-10-19

## 2018-10-19 RX ADMIN — IBUPROFEN 800 MG: 400 TABLET, FILM COATED ORAL at 16:20

## 2018-10-19 RX ADMIN — ESCITALOPRAM OXALATE 20 MG: 10 TABLET ORAL at 22:17

## 2018-10-19 RX ADMIN — BUPIVACAINE HYDROCHLORIDE AND EPINEPHRINE 6 ML: 2.5; 5 INJECTION, SOLUTION EPIDURAL; INFILTRATION; INTRACAUDAL; PERINEURAL at 09:02

## 2018-10-19 RX ADMIN — ACETAMINOPHEN 650 MG: 325 TABLET ORAL at 20:12

## 2018-10-19 RX ADMIN — SODIUM CHLORIDE, SODIUM LACTATE, POTASSIUM CHLORIDE, AND CALCIUM CHLORIDE 125 ML/HR: 600; 310; 30; 20 INJECTION, SOLUTION INTRAVENOUS at 09:06

## 2018-10-19 RX ADMIN — BUPIVACAINE HYDROCHLORIDE AND EPINEPHRINE 0.4 ML: 2.5; 5 INJECTION, SOLUTION EPIDURAL; INFILTRATION; INTRACAUDAL; PERINEURAL at 09:01

## 2018-10-19 RX ADMIN — PENICILLIN G POTASSIUM 2.5 MILLION UNITS: 20000000 POWDER, FOR SOLUTION INTRAVENOUS at 08:30

## 2018-10-19 RX ADMIN — SODIUM CHLORIDE 5 MILLION UNITS: 900 INJECTION, SOLUTION INTRAVENOUS at 00:20

## 2018-10-19 RX ADMIN — PENICILLIN G POTASSIUM 2.5 MILLION UNITS: 20000000 POWDER, FOR SOLUTION INTRAVENOUS at 04:30

## 2018-10-19 RX ADMIN — SODIUM CHLORIDE, SODIUM LACTATE, POTASSIUM CHLORIDE, AND CALCIUM CHLORIDE 125 ML/HR: 600; 310; 30; 20 INJECTION, SOLUTION INTRAVENOUS at 06:09

## 2018-10-19 RX ADMIN — OXYTOCIN-SODIUM CHLORIDE 0.9% IV SOLN 30 UNIT/500ML 1 MILLI-UNITS/MIN: 30-0.9/5 SOLUTION at 06:09

## 2018-10-19 RX ADMIN — Medication 12 ML/HR: at 09:20

## 2018-10-19 NOTE — LACTATION NOTE
This note was copied from a baby's chart. Lactation services introduced to P1 mother. Baby at 1 hour of life, vigorous at breast.  [place] cecilio nipple/slip offs readjusted hold to semi reclining cross cradle to finish up feeding observation. Mother plans to start out with breast feeding and states she will also formula feed. Process of lactogenesis and establishing supply reviewed. Pumping options discussed and provided a handout on obtaining her insurance provided breast pump. Skin to skin and bonding with baby. Reviewed I/0 log sheet and keeping track of daily expectations.

## 2018-10-19 NOTE — PROGRESS NOTES
0143:  Report from STEPHANIE Lofton RNC. Assumed care of pt. Pt is resting in bed, monitor off. C/O tenderness at IV site in R arm. It is noted to be slightly edematous and pink around insertion site, but flushes easily and w/o pain. Some tenderness to touch. Discussed options with pt including starting new site and pt would like to try a new IV.  18g placed in L forearm, flushed and locked. Assessment complete, discussed POC for am induction. Questions answered. 0440:  PCN infusing. EFM applied. 0510: Rivera bulb removed after deflating balloon. Tip intact. Pt up for shower and am self-care. 12:  SBAR report to RASHID Christy RN. Care turned over.

## 2018-10-19 NOTE — PROGRESS NOTES
Late entry:    Pt seen at 0745 hrs. Feeling some mild contractions on Pitocin s/p IC bulb. Cx 4/80/Vtx/0 st/AROM -clear. IUPC placed easily with clear fluid in tube. Pitocin titrate to MVU > 220. Signed out to Dr Vashti Dover at 0830 hrs.

## 2018-10-19 NOTE — ANESTHESIA PREPROCEDURE EVALUATION
Anesthetic History   No history of anesthetic complications            Review of Systems / Medical History  Patient summary reviewed, nursing notes reviewed and pertinent labs reviewed    Pulmonary  Within defined limits                 Neuro/Psych         Psychiatric history     Cardiovascular    Hypertension              Exercise tolerance: >4 METS     GI/Hepatic/Renal  Within defined limits              Endo/Other    Diabetes    Obesity     Other Findings              Physical Exam    Airway  Mallampati: III    Neck ROM: normal range of motion        Cardiovascular  Regular rate and rhythm,  S1 and S2 normal,  no murmur, click, rub, or gallop             Dental         Pulmonary  Breath sounds clear to auscultation               Abdominal  GI exam deferred       Other Findings            Anesthetic Plan    ASA: 2  Anesthesia type: CSE            Anesthetic plan and risks discussed with: Patient

## 2018-10-19 NOTE — L&D DELIVERY NOTE
Delivery Summary  Patient: Renay Schroeder             Circumcision:   NA-female  Additional Delivery Comments - Patient was admitted at 40 6/11 weeks for induction due to mild pre-eclampsia without severe features. She had cervical ripening balloon placed and this was removed the next morning and Pitocin started and AROM performed. She progressed quickly and pushed over 5 contractions for an uncomplicated  of baby girl. \"Solange\"      Information for the patient's :  Maico Sheikhkoki, Pending  [[id number]]       Labor Events:    Labor: No   Rupture Date: 10/19/2018   Rupture Time: 7:45 AM   Rupture Type AROM   Amniotic Fluid Volume:      Amniotic Fluid Description: Clear None   Induction: AROM; Rivera Bulb (balloon); Oxytocin       Augmentation: None   Labor Events:       Cervical Ripening: Rivera/EASI     Delivery Events:  Episiotomy: None   Laceration(s): Second degree perineal;Right labial     Repaired: Yes    Number of Repair Packets: 2   Suture Type and Size: Vicryl 3-0     Estimated Blood Loss (ml): 400ml       Delivery Date: 10/19/2018    Delivery Time: 12:01 PM  Delivery Type: Vaginal, Spontaneous  Sex:       Gestational Age: 37w0d   Delivery Clinician:  Ramana Crow  Living Status: Living   Delivery Location: L&D            APGARS  One minute Five minutes Ten minutes   Skin color: 0   1        Heart rate: 2   2        Grimace: 1   2        Muscle tone: 2   2        Breathin   2        Totals: 7   9            Presentation: Vertex    Position:   Occiput Anterior  Resuscitation Method:  Suctioning-bulb; Tactile Stimulation     Meconium Stained:        Cord Information: 3 Vessels  Complications: None  Cord around:    Delayed cord clamping?  Yes  Cord clamped date/time:10/19/2018 12:02 PM  Disposition of Cord Blood: Lab    Blood Gases Sent?: No    Placenta:  Date/Time: 10/19/2018 11:35 AM  Removal: Spontaneous      Appearance: Intact      Measurements:  Birth Weight: 2.83 kg      Birth Length: 49.5 cm      Head Circumference: 13 cm      Chest Circumference: 12.8 cm     Abdominal Girth: 31.8 cm    Other Providers:   Pao ALMARAZ;ISRAEL MONACO;CELESTE OCHOA;LYNNETTE ADAMS, Obstetrician;Primary Nurse;Primary  Nurse; Anesthesiologist;Crna;Nursery Nurse           Cord pH:  none    Episiotomy: None   Laceration(s): Second degree perineal;Right labial     Estimated Blood Loss (ml): 400    Labor Events  Method: AROM; Rivera Bulb (balloon); Oxytocin      Augmentation: None   Cervical Ripening:        [facility] Problems  Date Reviewed: 10/19/2018          Codes Class Noted POA    Mild preeclampsia, third trimester ICD-10-CM: O14.03  ICD-9-CM: 642.43  10/16/2018 Unknown              Operative Vaginal Delivery - none    Group B Strep:   Lab Results   Component Value Date/Time    GrBStrep, External Positive 10/05/2018     Information for the patient's :  Godwin Skaggs, Pending  [[id number]]   No results found for: ABORH, PCTABR, PCTDIG, BILI, ABORHEXT, ABORH    No results found for: APH, APCO2, APO2, AHCO3, ABEC, ABDC, O2ST, EPHV, PCO2V, PO2V, HCO3V, EBEV, EBDV, SITE, RSCOM

## 2018-10-19 NOTE — PROGRESS NOTES
TRANSFER - OUT REPORT:    Verbal report given to ASHLEY Noonan RN(name) on [facility]  being transferred to MIU(unit) for routine progression of care       Report consisted of patients Situation, Background, Assessment and   Recommendations(SBAR). Information from the following report(s) SBAR, Procedure Summary, Intake/Output and Recent Results was reviewed with the receiving nurse. Lines:   Peripheral IV 10/19/18 Inferior;Left;Lower; Posterior Arm (Active)   Site Assessment Clean, dry, & intact 10/19/2018  1:30 AM   Phlebitis Assessment 0 10/19/2018  1:30 AM   Infiltration Assessment 0 10/19/2018  1:30 AM   Dressing Status Clean, dry, & intact 10/19/2018  1:30 AM   Dressing Type Tape;Transparent 10/19/2018  1:30 AM   Hub Color/Line Status Green 10/19/2018  1:30 AM        Opportunity for questions and clarification was provided.

## 2018-10-19 NOTE — ROUTINE PROCESS
Bedside shift change report given to KISHORE Peralta (oncoming nurse) by RAIZA Campbell RN (offgoing nurse). Report included the following information SBAR, Procedure Summary, Intake/Output, MAR and Recent Results.

## 2018-10-19 NOTE — PROGRESS NOTES
TRANSFER - IN REPORT:    Verbal report received from RASHID Ron RN(name) on [employer]  being received from L&D(unit) for routine progression of care      Report consisted of patients Situation, Background, Assessment and   Recommendations(SBAR). Information from the following report(s) SBAR, Procedure Summary, Intake/Output, MAR and Recent Results was reviewed with the receiving nurse. Opportunity for questions and clarification was provided. Assessment completed upon patients arrival to unit and care assumed.

## 2018-10-19 NOTE — ANESTHESIA PROCEDURE NOTES
CSE Block    Performed by: Luis Enrique Bennett MD  Authorized by: Luis Enrique Bennett MD     Pre-Procedure  preanesthetic checklist: risks and benefits discussed, site marked and timeout performed      Procedure:   Patient Position:  Seated  Prep Region:  Lumbar  Prep: DuraPrep    Location:  L2-3    Epidural Needle:   Needle Type:  Tuohy  Needle Gauge:  17 G  Injection Technique:  Loss of resistance using air  Attempts:  1    Spinal Needle:   Needle Type: John  Needle Gauge:  25 G    Catheter:   Catheter Type:  Flex-tip  Catheter Size:  19 G  Events: no blood with aspiration, no paresthesia, negative aspiration test and CSF confirmed    Test Dose:  Bupivacaine 0.25% w/ epi and negative    Assessment:   Catheter Secured:  Tegaderm and tape  Insertion:  Uncomplicated  Patient tolerance:  Patient tolerated the procedure well with no immediate complications  Hands washed and mask worn during procedure. Spinal portion:    A 25 g pencil point spinal needle was placed through the Touhy x1 attempt until CSF was obtained. 0.4 mL 0.25% bupivacaine with 1:200K epinephrine was deposited into the CSF. -paresthesia.

## 2018-10-20 PROCEDURE — 74011250637 HC RX REV CODE- 250/637: Performed by: OBSTETRICS & GYNECOLOGY

## 2018-10-20 PROCEDURE — 86900 BLOOD TYPING SEROLOGIC ABO: CPT | Performed by: OBSTETRICS & GYNECOLOGY

## 2018-10-20 PROCEDURE — 36415 COLL VENOUS BLD VENIPUNCTURE: CPT | Performed by: OBSTETRICS & GYNECOLOGY

## 2018-10-20 PROCEDURE — 85461 HEMOGLOBIN FETAL: CPT | Performed by: OBSTETRICS & GYNECOLOGY

## 2018-10-20 PROCEDURE — 65410000002 HC RM PRIVATE OB

## 2018-10-20 RX ADMIN — ACETAMINOPHEN 650 MG: 325 TABLET ORAL at 18:20

## 2018-10-20 RX ADMIN — ACETAMINOPHEN 650 MG: 325 TABLET ORAL at 04:10

## 2018-10-20 RX ADMIN — VALACYCLOVIR HYDROCHLORIDE 500 MG: 500 TABLET, FILM COATED ORAL at 03:13

## 2018-10-20 RX ADMIN — VALACYCLOVIR HYDROCHLORIDE 500 MG: 500 TABLET, FILM COATED ORAL at 16:06

## 2018-10-20 RX ADMIN — IBUPROFEN 800 MG: 400 TABLET, FILM COATED ORAL at 00:01

## 2018-10-20 RX ADMIN — IBUPROFEN 800 MG: 400 TABLET, FILM COATED ORAL at 20:22

## 2018-10-20 RX ADMIN — ESCITALOPRAM OXALATE 20 MG: 10 TABLET ORAL at 20:22

## 2018-10-20 RX ADMIN — IBUPROFEN 800 MG: 400 TABLET, FILM COATED ORAL at 08:17

## 2018-10-20 NOTE — PROGRESS NOTES
Post-Partum Day Number 1 Progress Note    Janay Paez     Assessment: Doing well, post partum day 1  Pre-Eclampsia without severe features- pressures predominately normotensive  GDM- diet controlled  Plan:  1. Continue routine postpartum and perineal care as well as maternal education. 2. Will monitor blood pressure  3. Needs 2 hr Gtt 6-12wks postpartum     Information for the patient's :  Chioma Pae [[id number]]   Vaginal, Spontaneous   Patient doing well without significant complaint. Voiding without difficulty, normal lochia. Vitals:  Visit Vitals  /82 (BP 1 Location: Left arm, BP Patient Position: At rest)   Pulse 82   Temp 98.2 °F (36.8 °C)   Resp 16   Ht 5' 6\" (1.676 m)   Wt 88 kg (194 lb)   SpO2 91%   BMI 31.31 kg/m²     Temp (24hrs), Av.2 °F (36.8 °C), Min:97.8 °F (36.6 °C), Max:98.7 °F (37.1 °C)        Exam:   Patient without distress. Abdomen soft, fundus firm, nontender                Perineum with normal lochia noted. Lower extremities are negative for swelling, cords or tenderness.     Labs:     Lab Results   Component Value Date/Time    WBC 11.5 (H) 10/17/2018 04:01 AM    WBC 11.7 (H) 10/16/2018 04:12 PM    WBC 12.0 (H) 10/03/2018 09:08 PM    WBC 10.5 2015 12:05 AM    HGB 10.9 (L) 10/17/2018 04:01 AM    HGB 12.1 10/16/2018 04:12 PM    HGB 12.0 10/03/2018 09:08 PM    HGB 13.8 2015 12:05 AM    HCT 30.4 (L) 10/17/2018 04:01 AM    HCT 33.7 (L) 10/16/2018 04:12 PM    HCT 33.5 (L) 10/03/2018 09:08 PM    HCT 40.6 2015 12:05 AM    PLATELET 021  04:01 AM    PLATELET 585  04:12 PM    PLATELET 770  09:08 PM    PLATELET 734 79/15/5724 12:05 AM       Recent Results (from the past 24 hour(s))   GLUCOSE, POC    Collection Time: 10/19/18 11:29 AM   Result Value Ref Range    Glucose (POC) 89 65 - 100 mg/dL    Performed by Unkown

## 2018-10-20 NOTE — LACTATION NOTE
Ms. Godwin Skaggs was seen for follow-up lactation consult. She is pumping and supplementing with formula in addition to nursing infant at breast. She reports that she plans to mainly pump and feed EBM today, encouraged her to maintain a 2-3 hour pumping schedule in order to provide adequate stimulation to breast for milk supply.

## 2018-10-21 VITALS
DIASTOLIC BLOOD PRESSURE: 78 MMHG | TEMPERATURE: 97.7 F | HEART RATE: 72 BPM | HEIGHT: 66 IN | OXYGEN SATURATION: 94 % | RESPIRATION RATE: 18 BRPM | BODY MASS INDEX: 31.18 KG/M2 | SYSTOLIC BLOOD PRESSURE: 148 MMHG | WEIGHT: 194 LBS

## 2018-10-21 PROCEDURE — 74011250637 HC RX REV CODE- 250/637: Performed by: OBSTETRICS & GYNECOLOGY

## 2018-10-21 PROCEDURE — 74011250636 HC RX REV CODE- 250/636: Performed by: OBSTETRICS & GYNECOLOGY

## 2018-10-21 PROCEDURE — 3E0334Z INTRODUCTION OF SERUM, TOXOID AND VACCINE INTO PERIPHERAL VEIN, PERCUTANEOUS APPROACH: ICD-10-PCS | Performed by: OBSTETRICS & GYNECOLOGY

## 2018-10-21 RX ORDER — IBUPROFEN 800 MG/1
800 TABLET ORAL
Qty: 60 TAB | Refills: 1 | Status: SHIPPED | OUTPATIENT
Start: 2018-10-21 | End: 2020-05-29

## 2018-10-21 RX ORDER — OXYCODONE AND ACETAMINOPHEN 5; 325 MG/1; MG/1
1 TABLET ORAL
Qty: 16 TAB | Refills: 0 | Status: SHIPPED | OUTPATIENT
Start: 2018-10-21 | End: 2020-05-29

## 2018-10-21 RX ADMIN — VALACYCLOVIR HYDROCHLORIDE 500 MG: 500 TABLET, FILM COATED ORAL at 04:11

## 2018-10-21 RX ADMIN — IBUPROFEN 800 MG: 400 TABLET, FILM COATED ORAL at 04:11

## 2018-10-21 RX ADMIN — HUMAN RHO(D) IMMUNE GLOBULIN 0.3 MG: 300 INJECTION, SOLUTION INTRAMUSCULAR at 11:24

## 2018-10-21 NOTE — DISCHARGE INSTRUCTIONS
After Your Delivery (the Postpartum Period): Care Instructions  Your Care Instructions    Congratulations on the birth of your baby. Like pregnancy, the  period can be a time of excitement, consuelo, and exhaustion. You may look at your wondrous little baby and feel happy. You may also be overwhelmed by your new sleep hours and new responsibilities. At first, babies often sleep during the days and are awake at night. They do not have a pattern or routine. They may make sudden gasps, jerk themselves awake, or look like they have crossed eyes. These are all normal, and they may even make you smile. In these first weeks after delivery, try to take good care of yourself. It may take 4 to 6 weeks to feel like yourself again, and possibly longer if you had a  birth. You will likely feel very tired for several weeks. Your days will be full of ups and downs, but lots of consuelo as well. Follow-up care is a key part of your treatment and safety. Be sure to make and go to all appointments, and call your doctor if you are having problems. It's also a good idea to know your test results and keep a list of the medicines you take. How can you care for yourself at home? Take care of your body after delivery  · Use pads instead of tampons for the bloody flow that may last as long as 2 weeks. · Ease cramps with ibuprofen (Advil, Motrin). · Ease soreness of hemorrhoids and the area between your vagina and rectum with ice compresses or witch hazel pads. · Ease constipation by drinking lots of fluid and eating high-fiber foods. Ask your doctor about over-the-counter stool softeners. · Cleanse yourself with a gentle squeeze of warm water from a bottle instead of wiping with toilet paper. · Take a sitz bath in warm water several times a day. · Wear a good nursing bra. Ease sore and swollen breasts with warm, wet washcloths. · If you are not breastfeeding, use ice rather than heat for breast soreness.   · Your period may not start for several months if you are breastfeeding. You may bleed more, and longer at first, than you did before you got pregnant. · Wait until you are healed (about 4 to 6 weeks) before you have sexual intercourse. Your doctor will tell you when it is okay to have sex. · Try not to travel with your baby for 5 or 6 weeks. If you take a long car trip, make frequent stops to walk around and stretch. Avoid exhaustion  · Rest every day. Try to nap when your baby naps. · Ask another adult to be with you for a few days after delivery. · Plan for  if you have other children. · Stay flexible so you can eat at odd hours and sleep when you need to. Both you and your baby are making new schedules. · Plan small trips to get out of the house. Change can make you feel less tired. · Ask for help with housework, cooking, and shopping. Remind yourself that your job is to care for your baby. Know about help for postpartum depression  · \"Baby blues\" are common for the first 1 to 2 weeks after birth. You may cry or feel sad or irritable for no reason. · Rest whenever you can. Being tired makes it harder to handle your emotions. · Go for walks with your baby. · Talk to your partner, friends, and family about your feelings. · If your symptoms last for more than a few weeks, or if you feel very depressed, ask your doctor for help. · Postpartum depression can be treated. Support groups and counseling can help. Sometimes medicine can also help. Stay healthy  · Eat healthy foods so you have more energy and lose extra baby pounds. · If you breastfeed, avoid drugs. If you quit smoking during pregnancy, try to stay smoke-free. If you choose to have a drink now and then, have only one drink, and limit the number of occasions that you have a drink. Wait to breastfeed at least 2 hours after you have a drink to reduce the amount of alcohol the baby may get in the milk.   · Start daily exercise after 4 to 6 weeks, but rest when you feel tired. · Learn exercises to tone your belly. Do Kegel exercises to regain strength in your pelvic muscles. You can do these exercises while you stand or sit. ? Squeeze the same muscles you would use to stop your urine. Your belly and thighs should not move. ? Hold the squeeze for 3 seconds, and then relax for 3 seconds. ? Start with 3 seconds. Then add 1 second each week until you are able to squeeze for 10 seconds. ? Repeat the exercise 10 to 15 times for each session. Do three or more sessions each day. · Find a class for new mothers and new babies that has an exercise time. · If you had a  birth, give yourself a bit more time before you exercise, and be careful. When should you call for help? Call 911 anytime you think you may need emergency care. For example, call if:    · You passed out (lost consciousness).    Call your doctor now or seek immediate medical care if:    · You have severe vaginal bleeding. This means you are passing blood clots and soaking through a pad each hour for 2 or more hours.     · You are dizzy or lightheaded, or you feel like you may faint.     · You have a fever.     · You have new belly pain, or your pain gets worse.    Watch closely for changes in your health, and be sure to contact your doctor if:    · Your vaginal bleeding seems to be getting heavier.     · You have new or worse vaginal discharge.     · You feel sad, anxious, or hopeless for more than a few days.     · You do not get better as expected. Where can you learn more? Go to http://mason-lucy.info/. Enter A461 in the search box to learn more about \"After Your Delivery (the Postpartum Period): Care Instructions. \"  Current as of: 2017  Content Version: 11.8  © 9028-2826 [employer], [employer]. Care instructions adapted under license by [employer] (which disclaims liability or warranty for this information).  If you have questions about a medical condition or this instruction, always ask your healthcare professional. [place] [address] any warranty or liability for your use of this information.

## 2018-10-21 NOTE — LACTATION NOTE
Couplet Interdisciplinary Rounds     MATERNAL    Daily Goal:     Influenza screening completed: Patient refused   Tdap screening completed: YES   Rhogam Given:YES  MMR Given:N/A    VTE Prophylaxis: Not indicated, per Provider order    EPDS:            Patient Name: Janeth Horn Diagnosis: R/O Pre-eclampsia  Mild preeclampsia, third trimester  Mild preeclampsia, third trimester   Date of Admission: 10/16/2018 LOS: 3  Gestational Age: Gestational Age: <None>       Daily Goal:     Birth Weight: No birth weight on file.  Current Weight: Weight: 88 kg (194 lb)  % of Weight Change: Birth weight not on file    Feeding:   Metabolic Screen: YES    Hepatitis B:  YES    Discharge Bili:  YES  Car Seat Trial, if needed:  N/A      Patient/Family Teaching Needs:     Days before discharge: Ready for discharge    In Attendance:  Nursing and Physician

## 2018-10-21 NOTE — PROGRESS NOTES
Post-Partum Day Number 2 Progress Note    Janay Sanchezzell     Assessment: Doing well, post partum day 2  Pre-Eclampsia without severe features- pressures predominately normotensive with 2 mild range blood pressures in last 24 hours. GDM- diet controlled      Plan:   1. Discharge home today  2. Follow up in office in 6 weeks with Kailey Florian, *  3. Post partum activity advised, diet as tolerated  4. Discharge Medications: ibuprofen, percocet and medications prior to admission  5. 2 hr GTT in 6-12 weeks   6. One week BP check in office     Information for the patient's :  Delford Cough [[id number]]   Vaginal, Spontaneous   Patient doing well without significant complaint. Voiding without difficulty, normal lochia. Vitals:  Visit Vitals  /78 (BP 1 Location: Left arm, BP Patient Position: At rest)   Pulse 72   Temp 97.7 °F (36.5 °C)   Resp 18   Ht 5' 6\" (1.676 m)   Wt 88 kg (194 lb)   SpO2 94%   BMI 31.31 kg/m²     Temp (24hrs), Av.8 °F (36.6 °C), Min:97.4 °F (36.3 °C), Max:98.2 °F (36.8 °C)      Exam:         Patient without distress. Abdomen soft, fundus firm, nontender                 Lower extremities are negative for swelling, cords or tenderness. Labs:     Lab Results   Component Value Date/Time    WBC 11.5 (H) 10/17/2018 04:01 AM    WBC 11.7 (H) 10/16/2018 04:12 PM    WBC 12.0 (H) 10/03/2018 09:08 PM    WBC 10.5 2015 12:05 AM    HGB 10.9 (L) 10/17/2018 04:01 AM    HGB 12.1 10/16/2018 04:12 PM    HGB 12.0 10/03/2018 09:08 PM    HGB 13.8 2015 12:05 AM    HCT 30.4 (L) 10/17/2018 04:01 AM    HCT 33.7 (L) 10/16/2018 04:12 PM    HCT 33.5 (L) 10/03/2018 09:08 PM    HCT 40.6 2015 12:05 AM    PLATELET 854  04:01 AM    PLATELET 285  04:12 PM    PLATELET 338  09:08 PM    PLATELET 428  12:05 AM       No results found for this or any previous visit (from the past 24 hour(s)).

## 2018-10-21 NOTE — DISCHARGE SUMMARY
Obstetrical Discharge Summary     Name: Belem Up MRN: [id number]  SSN: [id number]    YOB: 1985  Age: 34 y.o. Sex: female      Admit Date: 10/16/2018    Discharge Date: 10/21/2018     Admitting Physician: Shivam Burns MD     Attending Physician: Franklyn Florian, *     Admission Diagnoses: R/O Pre-eclampsia  Mild preeclampsia, third trimester  Mild preeclampsia, third trimester    Discharge Diagnoses:   Information for the patient's :  Meagan Bates [[id number]]   Delivery of a 2.83 kg female infant via Vaginal, Spontaneous on 10/19/2018 at 12:01 PM  by . Apgars were 7 and 9. Additional Diagnoses:   Hospital Problems  Date Reviewed: 10/19/2018          Codes Class Noted POA    Mild preeclampsia, third trimester ICD-10-CM: O14.03  ICD-9-CM: 642.43  10/16/2018 Unknown             Lab Results   Component Value Date/Time    Rubella, External immune 2018    GrBStrep, External Positive 10/05/2018       Hospital Course: Postpartum course was complicated by pre-eclampsia without severe features which added 0 days to the patient's length of stay. Patient was overall normotensive and did not require any blood pressure medications. Disposition at Discharge: Home or self care    Discharged Condition: Stable    Patient Instructions:   Current Discharge Medication List      START taking these medications    Details   ibuprofen (MOTRIN) 800 mg tablet Take 1 Tab by mouth every eight (8) hours as needed for Pain. Qty: 60 Tab, Refills: 1    Associated Diagnoses: Postpartum pain      oxyCODONE-acetaminophen (PERCOCET) 5-325 mg per tablet Take 1 Tab by mouth every four (4) hours as needed. Max Daily Amount: 6 Tabs. Qty: 16 Tab, Refills: 0    Associated Diagnoses: Postpartum pain         CONTINUE these medications which have NOT CHANGED    Details   escitalopram oxalate (LEXAPRO) 10 mg tablet Take 20 mg by mouth daily.          STOP taking these medications valACYclovir (VALTREX) 500 mg tablet Comments:   Reason for Stopping:               Reference my discharge instructions. Follow-up Appointments   Procedures    FOLLOW UP VISIT Appointment in: One Week For blood pressure monitoring     For blood pressure monitoring     Standing Status:   Standing     Number of Occurrences:   1     Order Specific Question:   Appointment in     Answer:    One Week        Signed By:  Werner Buck MD     October 21, 2018

## 2018-10-21 NOTE — ROUTINE PROCESS
Bedside and Verbal shift change report given to RASHID Messina RN (oncoming nurse) by Rosie Montemayor RN (offgoing nurse). Report included the following information SBAR.

## 2018-10-21 NOTE — ROUTINE PROCESS
Bedside shift change report given to FRANCISCO Singh (oncoming nurse) by RAIZA Smith RN (offgoing nurse). Report included the following information SBAR, Procedure Summary, Intake/Output, MAR and Recent Results.

## 2018-10-22 LAB
ABO + RH BLD: NORMAL
BLD PROD TYP BPU: NORMAL
BPU ID: NORMAL
FETAL SCREEN,FMHS: NORMAL
STATUS OF UNIT,%ST: NORMAL
UNIT DIVISION, %UDIV: 0
WEAK D AG RBC QL: NORMAL

## 2018-11-05 ENCOUNTER — HOSPITAL ENCOUNTER (EMERGENCY)
Age: 33
Discharge: HOME | End: 2018-11-06
Attending: EMERGENCY MEDICINE | Admitting: OBSTETRICS & GYNECOLOGY
Payer: MEDICAID

## 2018-11-05 DIAGNOSIS — B37.9 YEAST INFECTION: ICD-10-CM

## 2018-11-05 LAB
ALBUMIN SERPL-MCNC: 3.8 G/DL (ref 3.5–5)
ALBUMIN/GLOB SERPL: 1 {RATIO} (ref 1.1–2.2)
ALP SERPL-CCNC: 99 U/L (ref 45–117)
ALT SERPL-CCNC: 28 U/L (ref 12–78)
ANION GAP SERPL CALC-SCNC: 6 MMOL/L (ref 5–15)
APPEARANCE UR: CLEAR
AST SERPL-CCNC: 18 U/L (ref 15–37)
BACTERIA URNS QL MICRO: NEGATIVE /HPF
BASOPHILS # BLD: 0.1 K/UL (ref 0–0.1)
BASOPHILS NFR BLD: 1 % (ref 0–1)
BILIRUB SERPL-MCNC: 0.2 MG/DL (ref 0.2–1)
BILIRUB UR QL: NEGATIVE
BUN SERPL-MCNC: 9 MG/DL (ref 6–20)
BUN/CREAT SERPL: 12 (ref 12–20)
CALCIUM SERPL-MCNC: 9.4 MG/DL (ref 8.5–10.1)
CHLORIDE SERPL-SCNC: 103 MMOL/L (ref 97–108)
CO2 SERPL-SCNC: 30 MMOL/L (ref 21–32)
COLOR UR: ABNORMAL
CREAT SERPL-MCNC: 0.74 MG/DL (ref 0.55–1.02)
DIFFERENTIAL METHOD BLD: ABNORMAL
EOSINOPHIL # BLD: 0.4 K/UL (ref 0–0.4)
EOSINOPHIL NFR BLD: 4 % (ref 0–7)
EPITH CASTS URNS QL MICRO: ABNORMAL /LPF
ERYTHROCYTE [DISTWIDTH] IN BLOOD BY AUTOMATED COUNT: 11.8 % (ref 11.5–14.5)
GLOBULIN SER CALC-MCNC: 3.8 G/DL (ref 2–4)
GLUCOSE SERPL-MCNC: 110 MG/DL (ref 65–100)
GLUCOSE UR STRIP.AUTO-MCNC: NEGATIVE MG/DL
HCT VFR BLD AUTO: 40.1 % (ref 35–47)
HGB BLD-MCNC: 13.4 G/DL (ref 11.5–16)
HGB UR QL STRIP: NEGATIVE
HYALINE CASTS URNS QL MICRO: ABNORMAL /LPF (ref 0–5)
IMM GRANULOCYTES # BLD: 0 K/UL (ref 0–0.04)
IMM GRANULOCYTES NFR BLD AUTO: 0 % (ref 0–0.5)
KETONES UR QL STRIP.AUTO: NEGATIVE MG/DL
LEUKOCYTE ESTERASE UR QL STRIP.AUTO: ABNORMAL
LYMPHOCYTES # BLD: 4.3 K/UL (ref 0.8–3.5)
LYMPHOCYTES NFR BLD: 39 % (ref 12–49)
MCH RBC QN AUTO: 31.3 PG (ref 26–34)
MCHC RBC AUTO-ENTMCNC: 33.4 G/DL (ref 30–36.5)
MCV RBC AUTO: 93.7 FL (ref 80–99)
MONOCYTES # BLD: 0.7 K/UL (ref 0–1)
MONOCYTES NFR BLD: 6 % (ref 5–13)
NEUTS SEG # BLD: 5.5 K/UL (ref 1.8–8)
NEUTS SEG NFR BLD: 50 % (ref 32–75)
NITRITE UR QL STRIP.AUTO: NEGATIVE
NRBC # BLD: 0 K/UL (ref 0–0.01)
NRBC BLD-RTO: 0 PER 100 WBC
PH UR STRIP: 7 [PH] (ref 5–8)
PLATELET # BLD AUTO: 399 K/UL (ref 150–400)
PMV BLD AUTO: 10.3 FL (ref 8.9–12.9)
POTASSIUM SERPL-SCNC: 4 MMOL/L (ref 3.5–5.1)
PROT SERPL-MCNC: 7.6 G/DL (ref 6.4–8.2)
PROT UR STRIP-MCNC: NEGATIVE MG/DL
RBC # BLD AUTO: 4.28 M/UL (ref 3.8–5.2)
RBC #/AREA URNS HPF: ABNORMAL /HPF (ref 0–5)
SODIUM SERPL-SCNC: 139 MMOL/L (ref 136–145)
SP GR UR REFRACTOMETRY: 1.01 (ref 1–1.03)
TROPONIN I SERPL-MCNC: <0.05 NG/ML
UA: UC IF INDICATED,UAUC: ABNORMAL
UROBILINOGEN UR QL STRIP.AUTO: 0.2 EU/DL (ref 0.2–1)
WBC # BLD AUTO: 11 K/UL (ref 3.6–11)
WBC URNS QL MICRO: ABNORMAL /HPF (ref 0–4)

## 2018-11-05 PROCEDURE — 99285 EMERGENCY DEPT VISIT HI MDM: CPT

## 2018-11-05 PROCEDURE — 80053 COMPREHEN METABOLIC PANEL: CPT | Performed by: EMERGENCY MEDICINE

## 2018-11-05 PROCEDURE — 84484 ASSAY OF TROPONIN QUANT: CPT | Performed by: EMERGENCY MEDICINE

## 2018-11-05 PROCEDURE — 36415 COLL VENOUS BLD VENIPUNCTURE: CPT | Performed by: EMERGENCY MEDICINE

## 2018-11-05 PROCEDURE — 74011250637 HC RX REV CODE- 250/637: Performed by: EMERGENCY MEDICINE

## 2018-11-05 PROCEDURE — 85025 COMPLETE CBC W/AUTO DIFF WBC: CPT | Performed by: EMERGENCY MEDICINE

## 2018-11-05 PROCEDURE — 81001 URINALYSIS AUTO W/SCOPE: CPT | Performed by: EMERGENCY MEDICINE

## 2018-11-05 PROCEDURE — 93005 ELECTROCARDIOGRAM TRACING: CPT

## 2018-11-05 RX ORDER — CLONIDINE HYDROCHLORIDE 0.1 MG/1
0.2 TABLET ORAL
Status: COMPLETED | OUTPATIENT
Start: 2018-11-05 | End: 2018-11-05

## 2018-11-05 RX ORDER — FLUCONAZOLE 100 MG/1
200 TABLET ORAL
Status: COMPLETED | OUTPATIENT
Start: 2018-11-05 | End: 2018-11-06

## 2018-11-05 RX ORDER — AMLODIPINE BESYLATE 5 MG/1
5 TABLET ORAL DAILY
Qty: 30 TAB | Refills: 0 | Status: SHIPPED | OUTPATIENT
Start: 2018-11-05 | End: 2020-05-29

## 2018-11-05 RX ADMIN — CLONIDINE HYDROCHLORIDE 0.2 MG: 0.1 TABLET ORAL at 23:54

## 2018-11-05 NOTE — LETTER
[address] 
[facility] EMERGENCY DEPT 
[address] [phone] 
[phone] Work/School Note Date: 11/5/2018 To Whom It May concern: 
 
Aaron Haley was seen and treated today in the emergency room by the following provider(s): 
Attending Provider: Prabhjot Galindo MD. Aaron Haley may return to work on 11/7/18.  
 
Sincerely, 
 
 
 
 
Barbara Hernandez MD

## 2018-11-06 VITALS
TEMPERATURE: 97.9 F | RESPIRATION RATE: 18 BRPM | HEIGHT: 66 IN | SYSTOLIC BLOOD PRESSURE: 114 MMHG | WEIGHT: 170.42 LBS | HEART RATE: 61 BPM | BODY MASS INDEX: 27.39 KG/M2 | OXYGEN SATURATION: 98 % | DIASTOLIC BLOOD PRESSURE: 62 MMHG

## 2018-11-06 LAB
ALBUMIN SERPL-MCNC: 3.3 G/DL (ref 3.5–5)
ALBUMIN/GLOB SERPL: 1 {RATIO} (ref 1.1–2.2)
ALP SERPL-CCNC: 81 U/L (ref 45–117)
ALT SERPL-CCNC: 24 U/L (ref 12–78)
ANION GAP SERPL CALC-SCNC: 8 MMOL/L (ref 5–15)
AST SERPL-CCNC: 13 U/L (ref 15–37)
ATRIAL RATE: 81 BPM
BILIRUB SERPL-MCNC: 0.3 MG/DL (ref 0.2–1)
BUN SERPL-MCNC: 7 MG/DL (ref 6–20)
BUN/CREAT SERPL: 10 (ref 12–20)
CALCIUM SERPL-MCNC: 8.7 MG/DL (ref 8.5–10.1)
CALCULATED P AXIS, ECG09: 38 DEGREES
CALCULATED R AXIS, ECG10: 39 DEGREES
CALCULATED T AXIS, ECG11: 42 DEGREES
CHLORIDE SERPL-SCNC: 105 MMOL/L (ref 97–108)
CO2 SERPL-SCNC: 27 MMOL/L (ref 21–32)
CREAT SERPL-MCNC: 0.68 MG/DL (ref 0.55–1.02)
DIAGNOSIS, 93000: NORMAL
ERYTHROCYTE [DISTWIDTH] IN BLOOD BY AUTOMATED COUNT: 12 % (ref 11.5–14.5)
GLOBULIN SER CALC-MCNC: 3.4 G/DL (ref 2–4)
GLUCOSE SERPL-MCNC: 91 MG/DL (ref 65–100)
HCT VFR BLD AUTO: 35.6 % (ref 35–47)
HGB BLD-MCNC: 12 G/DL (ref 11.5–16)
MCH RBC QN AUTO: 31.7 PG (ref 26–34)
MCHC RBC AUTO-ENTMCNC: 33.7 G/DL (ref 30–36.5)
MCV RBC AUTO: 93.9 FL (ref 80–99)
NRBC # BLD: 0 K/UL (ref 0–0.01)
NRBC BLD-RTO: 0 PER 100 WBC
P-R INTERVAL, ECG05: 170 MS
PLATELET # BLD AUTO: 324 K/UL (ref 150–400)
PMV BLD AUTO: 10.6 FL (ref 8.9–12.9)
POTASSIUM SERPL-SCNC: 3.8 MMOL/L (ref 3.5–5.1)
PROT SERPL-MCNC: 6.7 G/DL (ref 6.4–8.2)
Q-T INTERVAL, ECG07: 376 MS
QRS DURATION, ECG06: 74 MS
QTC CALCULATION (BEZET), ECG08: 436 MS
RBC # BLD AUTO: 3.79 M/UL (ref 3.8–5.2)
SODIUM SERPL-SCNC: 140 MMOL/L (ref 136–145)
URATE SERPL-MCNC: 4.8 MG/DL (ref 2.6–6)
VENTRICULAR RATE, ECG03: 81 BPM
WBC # BLD AUTO: 9 K/UL (ref 3.6–11)

## 2018-11-06 PROCEDURE — 74011250637 HC RX REV CODE- 250/637: Performed by: OBSTETRICS & GYNECOLOGY

## 2018-11-06 PROCEDURE — 84550 ASSAY OF BLOOD/URIC ACID: CPT

## 2018-11-06 PROCEDURE — 80053 COMPREHEN METABOLIC PANEL: CPT

## 2018-11-06 PROCEDURE — 85027 COMPLETE CBC AUTOMATED: CPT

## 2018-11-06 PROCEDURE — 36415 COLL VENOUS BLD VENIPUNCTURE: CPT

## 2018-11-06 PROCEDURE — 99282 EMERGENCY DEPT VISIT SF MDM: CPT

## 2018-11-06 PROCEDURE — 74011250637 HC RX REV CODE- 250/637: Performed by: EMERGENCY MEDICINE

## 2018-11-06 PROCEDURE — 74011250636 HC RX REV CODE- 250/636: Performed by: EMERGENCY MEDICINE

## 2018-11-06 RX ORDER — MAGNESIUM SULFATE HEPTAHYDRATE 40 MG/ML
2 INJECTION, SOLUTION INTRAVENOUS
Status: DISCONTINUED | OUTPATIENT
Start: 2018-11-06 | End: 2018-11-06

## 2018-11-06 RX ORDER — IBUPROFEN 400 MG/1
800 TABLET ORAL EVERY 8 HOURS
Status: DISCONTINUED | OUTPATIENT
Start: 2018-11-06 | End: 2018-11-06 | Stop reason: HOSPADM

## 2018-11-06 RX ORDER — ACETAMINOPHEN 325 MG/1
TABLET ORAL
Status: DISCONTINUED
Start: 2018-11-06 | End: 2018-11-06 | Stop reason: HOSPADM

## 2018-11-06 RX ORDER — ACETAMINOPHEN 325 MG/1
650 TABLET ORAL
Status: DISCONTINUED | OUTPATIENT
Start: 2018-11-06 | End: 2018-11-06 | Stop reason: HOSPADM

## 2018-11-06 RX ORDER — SODIUM CHLORIDE 0.9 % (FLUSH) 0.9 %
SYRINGE (ML) INJECTION
Status: DISCONTINUED
Start: 2018-11-06 | End: 2018-11-06 | Stop reason: HOSPADM

## 2018-11-06 RX ADMIN — ACETAMINOPHEN 650 MG: 325 TABLET ORAL at 07:23

## 2018-11-06 RX ADMIN — FLUCONAZOLE 200 MG: 100 TABLET ORAL at 00:20

## 2018-11-06 RX ADMIN — IBUPROFEN 800 MG: 400 TABLET ORAL at 12:17

## 2018-11-06 NOTE — H&P
History & Physical    Name: Nusrat Morris MRN: [id number]  SSN: [id number]    YOB: 1985  Age: 34 y.o. Sex: female      Subjective:     Reason for Evalution:  Pregnancy and elevated bp    History of Present Illness: Ms. Man Mcallister is a 34 y.o.  s/p  10/19/18 after induction for pre-eclampsia without severe features. BP's appeared normotensive postpartum and she was discharged home without any hypertensive medication. She also did not require magnesium sulfate during her hospitalization. She reports \"pre-hypertension\" for the 2 years prior to her pregnancy. She had been feeling relatively well aside from what she describes as feeling off balance for a few days but last evening she reports she had some dizziness and was confused and took her bp at home which she reports was 145/107 and she was also reported she was having difficulty taking a deep breath which led her to come to the ER. She denied scotomata at that time; she did report she had been seeing spots during the week but they were actually improving. She also denied any significant headache but reports she had a mild headache while waiting in the ER. She denies chest pain but reported some epigastric pain yesterday. Her initial bp in the ER was 163/107 and then 172/119. She did receive clonidine in the ER and then her bp was 110/82. Patient was sent up for observation overnight given this mixed picture.   Labs were normal.      OB History    Para Term  AB Living   2 0     1     SAB TAB Ectopic Molar Multiple Live Births   1                # Outcome Date GA Lbr Shahbaz/2nd Weight Sex Delivery Anes PTL Lv   2 Current            1 SAB 03/01/15     SAB   FD        Past Medical History:   Diagnosis Date    Essential hypertension     pre HTN for the past 2 years, No meds    Genital herpes     Gestational diabetes     No Meds, Diet controlled    Gestational hypertension     Headache     Psychiatric disorder     Anxiety and depression    Scoliosis     Vitamin D deficiency     2015     Past Surgical History:   Procedure Laterality Date    BREAST SURGERY PROCEDURE UNLISTED  2006    bilat breast reduction    HX GYN      HX HEENT      tonsillectomy    HX OTHER SURGICAL      27 yo     Social History     Occupational History    Not on file   Tobacco Use    Smoking status: Current Some Day Smoker     Packs/day: 0.50    Smokeless tobacco: Never Used   Substance and Sexual Activity    Alcohol use: No     Alcohol/week: 0.0 oz     Comment: rare since Dec 2014    Drug use: No    Sexual activity: Not Currently     Partners: Male      Family History   Problem Relation Age of Onset    Hypertension Father     Heart Disease Maternal Aunt     Cancer Maternal Uncle     Cancer Paternal Uncle     Heart Disease Paternal Uncle     Heart Disease Maternal Grandmother     Heart Disease Paternal Grandmother     Heart Disease Paternal Grandfather        Allergies   Allergen Reactions    Banana Itching    Carrot Itching    Peach (Prunus Persica) Itching    Tree Nut Itching    Watermelon Itching    Zoloft [Sertraline] Rash     Prior to Admission medications    Medication Sig Start Date End Date Taking? Authorizing Provider   amLODIPine (NORVASC) 5 mg tablet Take 1 Tab by mouth daily. 11/5/18  Yes Jeremi Snyder MD   ibuprofen (MOTRIN) 800 mg tablet Take 1 Tab by mouth every eight (8) hours as needed for Pain. 10/21/18   Jose Armando Penaloza MD   oxyCODONE-acetaminophen (PERCOCET) 5-325 mg per tablet Take 1 Tab by mouth every four (4) hours as needed. Max Daily Amount: 6 Tabs. 10/21/18   Jose Armando Penaloza MD   escitalopram oxalate (LEXAPRO) 10 mg tablet Take 20 mg by mouth daily. Provider, Historical        Review of Systems:  A comprehensive review of systems was negative except for that written in the History of Present Illness.      Objective:     Vitals:    Vitals:    11/06/18 0218 11/06/18 0238 11/06/18 0424 11/06/18 0557   BP: 114/74 118/83 114/72 120/74   Pulse:  63  (!) 56   Resp:  18 16 16   Temp:  98 °F (36.7 °C)  98.3 °F (36.8 °C)   SpO2:  99% 96% 99%   Weight:       Height:          Temp (24hrs), Av °F (36.7 °C), Min:97.8 °F (36.6 °C), Max:98.3 °F (36.8 °C)    BP  Min: 104/75  Max: 172/119     Physical Exam:  Patient without distress; laying in bed comfortably  Heart: Regular rate and rhythm or S1S2 present  Lung: clear to auscultation throughout lung fields, no wheezes, no rales, no rhonchi and normal respiratory effort  Abdomen: soft, nontender  Lower Extremities: no calf tenderness; DTR 2+ LE; 1+ UE    Lab/Data Review:  Recent Results (from the past 24 hour(s))   EKG, 12 LEAD, INITIAL    Collection Time: 18  8:04 PM   Result Value Ref Range    Ventricular Rate 81 BPM    Atrial Rate 81 BPM    P-R Interval 170 ms    QRS Duration 74 ms    Q-T Interval 376 ms    QTC Calculation (Bezet) 436 ms    Calculated P Axis 38 degrees    Calculated R Axis 39 degrees    Calculated T Axis 42 degrees    Diagnosis       Normal sinus rhythm with sinus arrhythmia  Normal ECG  When compared with ECG of 03-OCT-2018 20:41,  No significant change was found     URINALYSIS W/ REFLEX CULTURE    Collection Time: 18  9:35 PM   Result Value Ref Range    Color YELLOW/STRAW      Appearance CLEAR CLEAR      Specific gravity 1.013 1.003 - 1.030      pH (UA) 7.0 5.0 - 8.0      Protein NEGATIVE  NEG mg/dL    Glucose NEGATIVE  NEG mg/dL    Ketone NEGATIVE  NEG mg/dL    Bilirubin NEGATIVE  NEG      Blood NEGATIVE  NEG      Urobilinogen 0.2 0.2 - 1.0 EU/dL    Nitrites NEGATIVE  NEG      Leukocyte Esterase TRACE (A) NEG      WBC 0-4 0 - 4 /hpf    RBC 0-5 0 - 5 /hpf    Epithelial cells FEW FEW /lpf    Bacteria NEGATIVE  NEG /hpf    UA:UC IF INDICATED CULTURE NOT INDICATED BY UA RESULT CNI      Hyaline cast 0-2 0 - 5 /lpf   CBC WITH AUTOMATED DIFF    Collection Time: 18  9:56 PM   Result Value Ref Range    WBC 11.0 3.6 - 11.0 K/uL    RBC 4.28 3.80 - 5.20 M/uL    HGB 13.4 11.5 - 16.0 g/dL    HCT 40.1 35.0 - 47.0 %    MCV 93.7 80.0 - 99.0 FL    MCH 31.3 26.0 - 34.0 PG    MCHC 33.4 30.0 - 36.5 g/dL    RDW 11.8 11.5 - 14.5 %    PLATELET 068 130 - 791 K/uL    MPV 10.3 8.9 - 12.9 FL    NRBC 0.0 0  WBC    ABSOLUTE NRBC 0.00 0.00 - 0.01 K/uL    NEUTROPHILS 50 32 - 75 %    LYMPHOCYTES 39 12 - 49 %    MONOCYTES 6 5 - 13 %    EOSINOPHILS 4 0 - 7 %    BASOPHILS 1 0 - 1 %    IMMATURE GRANULOCYTES 0 0.0 - 0.5 %    ABS. NEUTROPHILS 5.5 1.8 - 8.0 K/UL    ABS. LYMPHOCYTES 4.3 (H) 0.8 - 3.5 K/UL    ABS. MONOCYTES 0.7 0.0 - 1.0 K/UL    ABS. EOSINOPHILS 0.4 0.0 - 0.4 K/UL    ABS. BASOPHILS 0.1 0.0 - 0.1 K/UL    ABS. IMM. GRANS. 0.0 0.00 - 0.04 K/UL    DF AUTOMATED     METABOLIC PANEL, COMPREHENSIVE    Collection Time: 11/05/18  9:56 PM   Result Value Ref Range    Sodium 139 136 - 145 mmol/L    Potassium 4.0 3.5 - 5.1 mmol/L    Chloride 103 97 - 108 mmol/L    CO2 30 21 - 32 mmol/L    Anion gap 6 5 - 15 mmol/L    Glucose 110 (H) 65 - 100 mg/dL    BUN 9 6 - 20 MG/DL    Creatinine 0.74 0.55 - 1.02 MG/DL    BUN/Creatinine ratio 12 12 - 20      GFR est AA >60 >60 ml/min/1.73m2    GFR est non-AA >60 >60 ml/min/1.73m2    Calcium 9.4 8.5 - 10.1 MG/DL    Bilirubin, total 0.2 0.2 - 1.0 MG/DL    ALT (SGPT) 28 12 - 78 U/L    AST (SGOT) 18 15 - 37 U/L    Alk.  phosphatase 99 45 - 117 U/L    Protein, total 7.6 6.4 - 8.2 g/dL    Albumin 3.8 3.5 - 5.0 g/dL    Globulin 3.8 2.0 - 4.0 g/dL    A-G Ratio 1.0 (L) 1.1 - 2.2     TROPONIN I    Collection Time: 11/05/18  9:56 PM   Result Value Ref Range    Troponin-I, Qt. <0.05 <0.05 ng/mL   CBC W/O DIFF    Collection Time: 11/06/18  6:06 AM   Result Value Ref Range    WBC 9.0 3.6 - 11.0 K/uL    RBC 3.79 (L) 3.80 - 5.20 M/uL    HGB 12.0 11.5 - 16.0 g/dL    HCT 35.6 35.0 - 47.0 %    MCV 93.9 80.0 - 99.0 FL    MCH 31.7 26.0 - 34.0 PG    MCHC 33.7 30.0 - 36.5 g/dL    RDW 12.0 11.5 - 14.5 %    PLATELET 294 762 - 342 K/uL    MPV 10.6 8.9 - 12.9 FL    NRBC 0.0 0  WBC    ABSOLUTE NRBC 0.00 0.00 - 0.01 K/uL       Assessment and Plan: Active Problems:    * No active hospital problems. *     34yo  presents approximately 3 weeks postpartum with elevated bp but given clonidine in the ER which dropped her bp and gave a mixed evaluation.   Concern for pp hypertension versus superimposed pre-eclampsia  -pt observed overnight and her bp's have improved since given the clonidine in the ER  -repeat labs this morning  -will continue to monitor bp's and watch patient closely    Signed By:  Sukh Ugarte DO     2018

## 2018-11-06 NOTE — DISCHARGE INSTRUCTIONS
Elevated Blood Pressure: Care Instructions  Your Care Instructions    Blood pressure is a measure of how hard the blood pushes against the walls of your arteries. It's normal for blood pressure to go up and down throughout the day. But if it stays up over time, you have high blood pressure. Two numbers tell you your blood pressure. The first number is the systolic pressure. It shows how hard the blood pushes when your heart is pumping. The second number is the diastolic pressure. It shows how hard the blood pushes between heartbeats, when your heart is relaxed and filling with blood. An ideal blood pressure in adults is less than 120/80 (say \"120 over 80\"). High blood pressure is 140/90 or higher. You have high blood pressure if your top number is 140 or higher or your bottom number is 90 or higher, or both. The main test for high blood pressure is simple, fast, and painless. To diagnose high blood pressure, your doctor will test your blood pressure at different times. After testing your blood pressure, your doctor may ask you to test it again when you are home. If you are diagnosed with high blood pressure, you can work with your doctor to make a long-term plan to manage it. Follow-up care is a key part of your treatment and safety. Be sure to make and go to all appointments, and call your doctor if you are having problems. It's also a good idea to know your test results and keep a list of the medicines you take. How can you care for yourself at home? · Do not smoke. Smoking increases your risk for heart attack and stroke. If you need help quitting, talk to your doctor about stop-smoking programs and medicines. These can increase your chances of quitting for good. · Stay at a healthy weight. · Try to limit how much sodium you eat to less than 2,300 milligrams (mg) a day. Your doctor may ask you to try to eat less than 1,500 mg a day. · Be physically active.  Get at least 30 minutes of exercise on most days of the week. Walking is a good choice. You also may want to do other activities, such as running, swimming, cycling, or playing tennis or team sports. · Avoid or limit alcohol. Talk to your doctor about whether you can drink any alcohol. · Eat plenty of fruits, vegetables, and low-fat dairy products. Eat less saturated and total fats. · Learn how to check your blood pressure at home. When should you call for help? Call your doctor now or seek immediate medical care if:  ? · Your blood pressure is much higher than normal (such as 180/110 or higher). ? · You think high blood pressure is causing symptoms such as:  ¨ Severe headache. ¨ Blurry vision. ? Watch closely for changes in your health, and be sure to contact your doctor if:  ? · You do not get better as expected. Where can you learn more? Go to http://mason-lucy.info/. Enter U982 in the search box to learn more about \"Elevated Blood Pressure: Care Instructions. \"  Current as of: September 21, 2016  Content Version: 11.4  © 3991-9014 [employer]. Care instructions adapted under license by [employer] (which disclaims liability or warranty for this information). If you have questions about a medical condition or this instruction, always ask your healthcare professional. [address] any warranty or liability for your use of this information.

## 2018-11-06 NOTE — ED NOTES
Patient resting comfortably in stretcher with call bell in reach, side rails x1. No further needs expressed. Mother at bedside.

## 2018-11-06 NOTE — PROGRESS NOTES
0254:  Pt arrived on unit via w/c after report received from ER nurse. Transferred self to bed, oriented to room and POC. Assessment and VS done. Pt states she was feeling \"exhausted and off balance\" today, \"couldn't catch\" her breath, and has been having spots in her vision. Came to ER with elevated BP and was treated with Clonidine which has lowered BP and made her feel very \"relaxed. \"  Pt had  on 10/19/18. Pumping and bottle feeding 1 bottle per day. Pumping supplies available in room and pt desires to pump in am.  Her mother is caring for the infant. Continues to have scant lochia and was treated this week for what she believes was a yeast infection. Pt given water pitcher, encouraged to rest.  All lights off in room. 0013:  Labs drawn, pt tolerated well. Has been sleeping well. BPs within normal range. 4410:  Lab called to notify that PST tube hemolysed. Lab redrawn and sent. Dr Corinne Belt currently at bs.    3824:  SBAR report at bs to RAIZA Smith RnC. Care turned over.

## 2018-11-06 NOTE — ED NOTES
TRANSFER - OUT REPORT:    Verbal report given to Bren Lofton RN (name) on Mirela Briceño  being transferred to [facility] and Delivery (unit) for routine progression of care       Report consisted of patients Situation, Background, Assessment and   Recommendations(SBAR). Information from the following report(s) SBAR, Kardex, ED Summary, MAR, Recent Results and Cardiac Rhythm NSR was reviewed with the receiving nurse. Lines:   Peripheral IV 11/06/18 Anterior;Right Antecubital (Active)   Site Assessment Clean, dry, & intact 11/6/2018  1:20 AM   Phlebitis Assessment 0 11/6/2018  1:20 AM   Infiltration Assessment 0 11/6/2018  1:20 AM   Dressing Status Clean, dry, & intact 11/6/2018  1:20 AM   Dressing Type Transparent 11/6/2018  1:20 AM   Hub Color/Line Status Pink; Infusing;Flushed 11/6/2018  1:20 AM        Opportunity for questions and clarification was provided.       Patient transported with:   [facility]

## 2018-11-06 NOTE — PROGRESS NOTES
Gynecology Progress Note    Janay Paez    Assessment:   33 yo PPD 18 s/p  for pre-e. Presented to ED last night with sx of HA and confusion. Found to be severely hypertensive (170s/110s) and was given clonidine to which her BPs responded rapidly and have been normal since that time (about 2300). Labs have remained normal and there was no protein on her urinalysis. Symptoms beginning to return this am although BP while I was in the room was 121/76. Suspect there may be a component of anxiety contributing to her presentation, more so than persistent pre-e. Plan: Observe today until afternoon with hourly BPs. If her symptoms persist but she remains normotensive would try an anxiolytic. If her BPs peak again, would have low threshold for Mg seizure prophylaxis and a more appropriate antihypertensive for a breastfeeder than clonidine. S:  This morning, reports her HA is returning a little bit and she is feeling mildly confused. Denies scotomata, SOB, RUQ pain. Orders/Charges: High    Vitals:  Visit Vitals  /76   Pulse 68   Temp 97.9 °F (36.6 °C)   Resp 18   Ht 5' 6\" (1.676 m)   Wt 77.3 kg (170 lb 6.7 oz)   SpO2 98%   BMI 27.51 kg/m²     Temp (24hrs), Av °F (36.7 °C), Min:97.8 °F (36.6 °C), Max:98.3 °F (36.8 °C)      Last 24hr Input/Output:    Intake/Output Summary (Last 24 hours) at 2018 0917  Last data filed at 2018 0250  Gross per 24 hour   Intake --   Output 100 ml   Net -100 ml          Exam:  General: alert, cooperative, no distress, appears stated age     Lung: clear to auscultation bilaterally     Heart: regular rate and rhythm, S1, S2 normal, no murmur, click, rub or gallop     Abdomen: abdomen is soft without significant tenderness, masses, organomegaly or guarding     Extremities: extremities normal, atraumatic, no cyanosis or edema   Neuro: no objective confusion noted, MMS OK.         Labs:   Lab Results   Component Value Date/Time    WBC 9.0 11/06/2018 06:06 AM    WBC 11.0 11/05/2018 09:56 PM    WBC 11.5 (H) 10/17/2018 04:01 AM    WBC 11.7 (H) 10/16/2018 04:12 PM    WBC 12.0 (H) 10/03/2018 09:08 PM    WBC 10.5 04/30/2015 12:05 AM    HGB 12.0 11/06/2018 06:06 AM    HGB 13.4 11/05/2018 09:56 PM    HGB 10.9 (L) 10/17/2018 04:01 AM    HGB 12.1 10/16/2018 04:12 PM    HGB 12.0 10/03/2018 09:08 PM    HGB 13.8 04/30/2015 12:05 AM    HCT 35.6 11/06/2018 06:06 AM    HCT 40.1 11/05/2018 09:56 PM    HCT 30.4 (L) 10/17/2018 04:01 AM    HCT 33.7 (L) 10/16/2018 04:12 PM    HCT 33.5 (L) 10/03/2018 09:08 PM    HCT 40.6 04/30/2015 12:05 AM    PLATELET 240 07/59/3433 06:06 AM    PLATELET 527 30/46/4702 09:56 PM    PLATELET 753 02/68/9705 04:01 AM    PLATELET 897 15/93/1573 04:12 PM    PLATELET 462 37/23/5819 09:08 PM    PLATELET 891 01/47/7422 12:05 AM       Recent Results (from the past 24 hour(s))   EKG, 12 LEAD, INITIAL    Collection Time: 11/05/18  8:04 PM   Result Value Ref Range    Ventricular Rate 81 BPM    Atrial Rate 81 BPM    P-R Interval 170 ms    QRS Duration 74 ms    Q-T Interval 376 ms    QTC Calculation (Bezet) 436 ms    Calculated P Axis 38 degrees    Calculated R Axis 39 degrees    Calculated T Axis 42 degrees    Diagnosis       Normal sinus rhythm with sinus arrhythmia  Normal ECG  When compared with ECG of 03-OCT-2018 20:41,  No significant change was found     URINALYSIS W/ REFLEX CULTURE    Collection Time: 11/05/18  9:35 PM   Result Value Ref Range    Color YELLOW/STRAW      Appearance CLEAR CLEAR      Specific gravity 1.013 1.003 - 1.030      pH (UA) 7.0 5.0 - 8.0      Protein NEGATIVE  NEG mg/dL    Glucose NEGATIVE  NEG mg/dL    Ketone NEGATIVE  NEG mg/dL    Bilirubin NEGATIVE  NEG      Blood NEGATIVE  NEG      Urobilinogen 0.2 0.2 - 1.0 EU/dL    Nitrites NEGATIVE  NEG      Leukocyte Esterase TRACE (A) NEG      WBC 0-4 0 - 4 /hpf    RBC 0-5 0 - 5 /hpf    Epithelial cells FEW FEW /lpf    Bacteria NEGATIVE  NEG /hpf    UA:UC IF INDICATED CULTURE NOT INDICATED BY UA RESULT CNI      Hyaline cast 0-2 0 - 5 /lpf   CBC WITH AUTOMATED DIFF    Collection Time: 11/05/18  9:56 PM   Result Value Ref Range    WBC 11.0 3.6 - 11.0 K/uL    RBC 4.28 3.80 - 5.20 M/uL    HGB 13.4 11.5 - 16.0 g/dL    HCT 40.1 35.0 - 47.0 %    MCV 93.7 80.0 - 99.0 FL    MCH 31.3 26.0 - 34.0 PG    MCHC 33.4 30.0 - 36.5 g/dL    RDW 11.8 11.5 - 14.5 %    PLATELET 967 129 - 378 K/uL    MPV 10.3 8.9 - 12.9 FL    NRBC 0.0 0  WBC    ABSOLUTE NRBC 0.00 0.00 - 0.01 K/uL    NEUTROPHILS 50 32 - 75 %    LYMPHOCYTES 39 12 - 49 %    MONOCYTES 6 5 - 13 %    EOSINOPHILS 4 0 - 7 %    BASOPHILS 1 0 - 1 %    IMMATURE GRANULOCYTES 0 0.0 - 0.5 %    ABS. NEUTROPHILS 5.5 1.8 - 8.0 K/UL    ABS. LYMPHOCYTES 4.3 (H) 0.8 - 3.5 K/UL    ABS. MONOCYTES 0.7 0.0 - 1.0 K/UL    ABS. EOSINOPHILS 0.4 0.0 - 0.4 K/UL    ABS. BASOPHILS 0.1 0.0 - 0.1 K/UL    ABS. IMM. GRANS. 0.0 0.00 - 0.04 K/UL    DF AUTOMATED     METABOLIC PANEL, COMPREHENSIVE    Collection Time: 11/05/18  9:56 PM   Result Value Ref Range    Sodium 139 136 - 145 mmol/L    Potassium 4.0 3.5 - 5.1 mmol/L    Chloride 103 97 - 108 mmol/L    CO2 30 21 - 32 mmol/L    Anion gap 6 5 - 15 mmol/L    Glucose 110 (H) 65 - 100 mg/dL    BUN 9 6 - 20 MG/DL    Creatinine 0.74 0.55 - 1.02 MG/DL    BUN/Creatinine ratio 12 12 - 20      GFR est AA >60 >60 ml/min/1.73m2    GFR est non-AA >60 >60 ml/min/1.73m2    Calcium 9.4 8.5 - 10.1 MG/DL    Bilirubin, total 0.2 0.2 - 1.0 MG/DL    ALT (SGPT) 28 12 - 78 U/L    AST (SGOT) 18 15 - 37 U/L    Alk.  phosphatase 99 45 - 117 U/L    Protein, total 7.6 6.4 - 8.2 g/dL    Albumin 3.8 3.5 - 5.0 g/dL    Globulin 3.8 2.0 - 4.0 g/dL    A-G Ratio 1.0 (L) 1.1 - 2.2     TROPONIN I    Collection Time: 11/05/18  9:56 PM   Result Value Ref Range    Troponin-I, Qt. <0.05 <0.05 ng/mL   CBC W/O DIFF    Collection Time: 11/06/18  6:06 AM   Result Value Ref Range    WBC 9.0 3.6 - 11.0 K/uL    RBC 3.79 (L) 3.80 - 5.20 M/uL    HGB 12.0 11.5 - 16.0 g/dL HCT 35.6 35.0 - 47.0 %    MCV 93.9 80.0 - 99.0 FL    MCH 31.7 26.0 - 34.0 PG    MCHC 33.7 30.0 - 36.5 g/dL    RDW 12.0 11.5 - 14.5 %    PLATELET 609 643 - 660 K/uL    MPV 10.6 8.9 - 12.9 FL    NRBC 0.0 0  WBC    ABSOLUTE NRBC 0.00 0.00 - 9.62 K/uL   METABOLIC PANEL, COMPREHENSIVE    Collection Time: 11/06/18  6:46 AM   Result Value Ref Range    Sodium 140 136 - 145 mmol/L    Potassium 3.8 3.5 - 5.1 mmol/L    Chloride 105 97 - 108 mmol/L    CO2 27 21 - 32 mmol/L    Anion gap 8 5 - 15 mmol/L    Glucose 91 65 - 100 mg/dL    BUN 7 6 - 20 MG/DL    Creatinine 0.68 0.55 - 1.02 MG/DL    BUN/Creatinine ratio 10 (L) 12 - 20      GFR est AA >60 >60 ml/min/1.73m2    GFR est non-AA >60 >60 ml/min/1.73m2    Calcium 8.7 8.5 - 10.1 MG/DL    Bilirubin, total 0.3 0.2 - 1.0 MG/DL    ALT (SGPT) 24 12 - 78 U/L    AST (SGOT) 13 (L) 15 - 37 U/L    Alk.  phosphatase 81 45 - 117 U/L    Protein, total 6.7 6.4 - 8.2 g/dL    Albumin 3.3 (L) 3.5 - 5.0 g/dL    Globulin 3.4 2.0 - 4.0 g/dL    A-G Ratio 1.0 (L) 1.1 - 2.2     URIC ACID    Collection Time: 11/06/18  6:46 AM   Result Value Ref Range    Uric acid 4.8 2.6 - 6.0 MG/DL

## 2018-11-06 NOTE — ED TRIAGE NOTES
Pt reports her BP was 145/108 at home today and she has been feeling exhausted and SOB. Pt reports she has a history of anxiety.

## 2018-11-06 NOTE — ED NOTES
Pt ambulatory to ED. Pt c/o of hypertension that began about a week ago. Pt reports she had pre-eclampsia and was pre-hypertension prior to being pregnant. Pt reports headache, SOB and dizziness. Pt denies vision changes. Pt reports feeling like she has a yeast infection.

## 2018-11-06 NOTE — ED PROVIDER NOTES
EMERGENCY DEPARTMENT HISTORY AND PHYSICAL EXAM    Date: 11/5/2018  Patient Name: Aury Najera    History of Presenting Illness     Chief Complaint   Patient presents with    Hypertension       History Provided By: Patient    HPI: Aury Najera is a 34 y.o. female, pmhx anxiety, who presents ambulatory to the ED c/o progressively worsened hypertension x today. Pt states she is current 2.5 weeks post-partum. She notes she was pre-eclamptic during her pregnancy and induced (vaginal delivery) x3 weeks early. Pt denies being on any medications for hypertension during the pregnancy or currently. She states throughout the day she was feeling fatigued, had associated intermittent dizziness and nausea. Pt reports checking her blood pressure at home, which she noted to be elevated prompting her visit to the ED. Of note, the pt is currently breast feeding. She reports a hx of anxiety, which she is on Lexapro for, noting her dosage was increased x2 days ago. She denies takign any medications to modify her sxs. Pt specifically denies any fever, congestion, cough, shortness of breath, chest pain, abdominal pain, vomiting, diarrhea, dysuria, or urinary frequency. PCP: Patient Melody Sigala   OBGYN: Bhargavi Nunez MD    PMHx: Significant for anxiety, scoliosis  PSHx: Significant for tonsillectomy, bilateral breast reduction   Social Hx: +tobacco (8.3SML), -EtOH, -Illicit Drugs     There are no other complaints, changes, or physical findings at this time. Current Facility-Administered Medications   Medication Dose Route Frequency Provider Last Rate Last Dose    magnesium sulfate 2 g/50 ml IVPB (premix or compounded)  2 g IntraVENous NOW Bola Easton MD         Current Outpatient Medications   Medication Sig Dispense Refill    amLODIPine (NORVASC) 5 mg tablet Take 1 Tab by mouth daily. 30 Tab 0    ibuprofen (MOTRIN) 800 mg tablet Take 1 Tab by mouth every eight (8) hours as needed for Pain.  60 Tab 1    oxyCODONE-acetaminophen (PERCOCET) 5-325 mg per tablet Take 1 Tab by mouth every four (4) hours as needed. Max Daily Amount: 6 Tabs. 16 Tab 0    escitalopram oxalate (LEXAPRO) 10 mg tablet Take 20 mg by mouth daily. Past History     Past Medical History:  Past Medical History:   Diagnosis Date    Essential hypertension     pre HTN for the past 2 years, No meds    Genital herpes     Gestational diabetes     No Meds, Diet controlled    Gestational hypertension     Headache     Psychiatric disorder     Anxiety and depression    Scoliosis     Vitamin D deficiency     2015       Past Surgical History:  Past Surgical History:   Procedure Laterality Date    BREAST SURGERY PROCEDURE UNLISTED  2006    bilat breast reduction    HX GYN      HX HEENT      tonsillectomy    HX OTHER SURGICAL      25 yo       Family History:  Family History   Problem Relation Age of Onset    Hypertension Father     Heart Disease Maternal Aunt     Cancer Maternal Uncle     Cancer Paternal Uncle     Heart Disease Paternal Uncle     Heart Disease Maternal Grandmother     Heart Disease Paternal Grandmother     Heart Disease Paternal Grandfather        Social History:  Social History     Tobacco Use    Smoking status: Current Some Day Smoker     Packs/day: 0.50    Smokeless tobacco: Never Used   Substance Use Topics    Alcohol use: No     Alcohol/week: 0.0 oz     Comment: rare since Dec 2014    Drug use: No       Allergies: Allergies   Allergen Reactions    Banana Itching    Carrot Itching    Peach (Prunus Persica) Itching    Tree Nut Itching    Watermelon Itching    Zoloft [Sertraline] Rash         Review of Systems   Review of Systems   Constitutional: Positive for fatigue. Negative for fever. Eyes: Negative. Respiratory: Negative. Negative for shortness of breath. Cardiovascular: Negative for chest pain. Hypertension   Gastrointestinal: Positive for nausea. Negative for abdominal pain and vomiting.    Endocrine: Negative. Genitourinary: Negative. Negative for difficulty urinating, dysuria and hematuria. Musculoskeletal: Negative. Skin: Negative. Allergic/Immunologic: Negative. Neurological: Positive for dizziness. Psychiatric/Behavioral: Negative for suicidal ideas. All other systems reviewed and are negative. Physical Exam   Physical Exam   Constitutional: She is oriented to person, place, and time. She appears well-developed and well-nourished. No distress. HENT:   Head: Normocephalic and atraumatic. Nose: Nose normal.   Eyes: Conjunctivae and EOM are normal. No scleral icterus. Neck: Normal range of motion. No tracheal deviation present. Cardiovascular: Normal rate, regular rhythm, normal heart sounds and intact distal pulses. Exam reveals no friction rub. No murmur heard. Pulmonary/Chest: Effort normal and breath sounds normal. No stridor. No respiratory distress. She has no wheezes. She has no rales. Abdominal: Soft. Bowel sounds are normal. She exhibits no distension. There is no tenderness. There is no rebound. Musculoskeletal: Normal range of motion. She exhibits no tenderness. Neurological: She is alert and oriented to person, place, and time. No cranial nerve deficit. Skin: Skin is warm and dry. No rash noted. She is not diaphoretic. Psychiatric: She has a normal mood and affect. Her speech is normal and behavior is normal. Judgment and thought content normal. Cognition and memory are normal.   Nursing note and vitals reviewed.         Diagnostic Study Results     Labs -     Recent Results (from the past 12 hour(s))   EKG, 12 LEAD, INITIAL    Collection Time: 11/05/18  8:04 PM   Result Value Ref Range    Ventricular Rate 81 BPM    Atrial Rate 81 BPM    P-R Interval 170 ms    QRS Duration 74 ms    Q-T Interval 376 ms    QTC Calculation (Bezet) 436 ms    Calculated P Axis 38 degrees    Calculated R Axis 39 degrees    Calculated T Axis 42 degrees    Diagnosis       Normal sinus rhythm with sinus arrhythmia  Normal ECG  When compared with ECG of 03-OCT-2018 20:41,  No significant change was found     URINALYSIS W/ REFLEX CULTURE    Collection Time: 11/05/18  9:35 PM   Result Value Ref Range    Color YELLOW/STRAW      Appearance CLEAR CLEAR      Specific gravity 1.013 1.003 - 1.030      pH (UA) 7.0 5.0 - 8.0      Protein NEGATIVE  NEG mg/dL    Glucose NEGATIVE  NEG mg/dL    Ketone NEGATIVE  NEG mg/dL    Bilirubin NEGATIVE  NEG      Blood NEGATIVE  NEG      Urobilinogen 0.2 0.2 - 1.0 EU/dL    Nitrites NEGATIVE  NEG      Leukocyte Esterase TRACE (A) NEG      WBC 0-4 0 - 4 /hpf    RBC 0-5 0 - 5 /hpf    Epithelial cells FEW FEW /lpf    Bacteria NEGATIVE  NEG /hpf    UA:UC IF INDICATED CULTURE NOT INDICATED BY UA RESULT CNI      Hyaline cast 0-2 0 - 5 /lpf   CBC WITH AUTOMATED DIFF    Collection Time: 11/05/18  9:56 PM   Result Value Ref Range    WBC 11.0 3.6 - 11.0 K/uL    RBC 4.28 3.80 - 5.20 M/uL    HGB 13.4 11.5 - 16.0 g/dL    HCT 40.1 35.0 - 47.0 %    MCV 93.7 80.0 - 99.0 FL    MCH 31.3 26.0 - 34.0 PG    MCHC 33.4 30.0 - 36.5 g/dL    RDW 11.8 11.5 - 14.5 %    PLATELET 687 649 - 964 K/uL    MPV 10.3 8.9 - 12.9 FL    NRBC 0.0 0  WBC    ABSOLUTE NRBC 0.00 0.00 - 0.01 K/uL    NEUTROPHILS 50 32 - 75 %    LYMPHOCYTES 39 12 - 49 %    MONOCYTES 6 5 - 13 %    EOSINOPHILS 4 0 - 7 %    BASOPHILS 1 0 - 1 %    IMMATURE GRANULOCYTES 0 0.0 - 0.5 %    ABS. NEUTROPHILS 5.5 1.8 - 8.0 K/UL    ABS. LYMPHOCYTES 4.3 (H) 0.8 - 3.5 K/UL    ABS. MONOCYTES 0.7 0.0 - 1.0 K/UL    ABS. EOSINOPHILS 0.4 0.0 - 0.4 K/UL    ABS. BASOPHILS 0.1 0.0 - 0.1 K/UL    ABS. IMM.  GRANS. 0.0 0.00 - 0.04 K/UL    DF AUTOMATED     METABOLIC PANEL, COMPREHENSIVE    Collection Time: 11/05/18  9:56 PM   Result Value Ref Range    Sodium 139 136 - 145 mmol/L    Potassium 4.0 3.5 - 5.1 mmol/L    Chloride 103 97 - 108 mmol/L    CO2 30 21 - 32 mmol/L    Anion gap 6 5 - 15 mmol/L    Glucose 110 (H) 65 - 100 mg/dL    BUN 9 6 - 20 MG/DL Creatinine 0.74 0.55 - 1.02 MG/DL    BUN/Creatinine ratio 12 12 - 20      GFR est AA >60 >60 ml/min/1.73m2    GFR est non-AA >60 >60 ml/min/1.73m2    Calcium 9.4 8.5 - 10.1 MG/DL    Bilirubin, total 0.2 0.2 - 1.0 MG/DL    ALT (SGPT) 28 12 - 78 U/L    AST (SGOT) 18 15 - 37 U/L    Alk. phosphatase 99 45 - 117 U/L    Protein, total 7.6 6.4 - 8.2 g/dL    Albumin 3.8 3.5 - 5.0 g/dL    Globulin 3.8 2.0 - 4.0 g/dL    A-G Ratio 1.0 (L) 1.1 - 2.2     TROPONIN I    Collection Time: 11/05/18  9:56 PM   Result Value Ref Range    Troponin-I, Qt. <0.05 <0.05 ng/mL       Radiologic Studies -   No orders to display     CT Results  (Last 48 hours)    None        CXR Results  (Last 48 hours)    None            Medical Decision Making   I am the first provider for this patient. I reviewed the vital signs, available nursing notes, past medical history, past surgical history, family history and social history. Vital Signs-Reviewed the patient's vital signs. Patient Vitals for the past 12 hrs:   Temp Pulse Resp BP SpO2   11/06/18 0045 -- -- -- 110/82 99 %   11/06/18 0028 -- -- -- (!) 131/97 99 %   11/05/18 2352 -- -- -- -- 100 %   11/05/18 2120 -- 79 16 (!) 172/119 98 %   11/05/18 2001 97.8 °F (36.6 °C) 81 16 (!) 163/107 100 %       Pulse Oximetry Analysis - 100% on RA    Cardiac Monitor:   Rate: 81 bpm  Rhythm: Normal Sinus Rhythm      Records Reviewed: Nursing Notes, Old Medical Records, Previous Radiology Studies and Previous Laboratory Studies    Provider Notes (Medical Decision Making):     DDX:  Pre-ecclampsia, electrolyte abnormality, uti, dehydration, kidney dysfunction    Plan:  Labs, antihypertensive    Impression:  Pre-ecclampsia    ED Course:   Initial assessment performed. The patients presenting problems have been discussed, and they are in agreement with the care plan formulated and outlined with them. I have encouraged them to ask questions as they arise throughout their visit.     I reviewed our electronic medical record system for any past medical records that were available that may contribute to the patients current condition, the nursing notes and and vital signs from today's visit    Nursing notes will be reviewed as they become available in realtime while the pt has been in the ED. Jenna Little MD    I have spent 3-7 minutes discussing the medical risks of prolonged smoking habits and advised the patient of the benefits of the cessation of smoking, providing specific suggestions on how to quit. Jenna Little MD    EKG interpretation 2004: NSR, nl Axis, rate 81; , QRS 74, QTc 436; no acute ischemia; Jenna Little MD       CONSULT NOTE:   12:40 AM  Jenna Little MD spoke with Dr. Casie Clay,   Specialty: [facility] Dr. Casie Clay due to pre-eclampsia. Discussed pt's HPI and available diagnostic results thus far. Expressed concerns for evaluation / needed admission. Consultant recommends sending pt to L&D for further eval noting marekedly elevated blood pressures prior to clonidine administration. Discussed use of mag for pressures. Will order while pt prepares to go Heaven Martinez MD    PROGRESS NOTE:  1:17 AM  Pt reevaluated, BP improved. Pt updated on current plan of care, including admission. Pt and family in agreement with plan. Written by Zacarias Grossman ED Scribe, as dictated by Jenna Little MD    PROGRESS NOTE  2:07 AM   Spoke with Dr. Casie Clay again, notes she did not want the pt to receive mag yet, noted she felt the pt MAY need mag but did not intend for us to start in ED. Had nursing  Stop the mag- had Ordered a 2g Bolus of mag, only received infusion for 5 minutes. Pt be transported up to L&D for further monitoring, BP improved after clonidine. Critical Care Time:     none    PLAN:  1. Current Discharge Medication List      START taking these medications    Details   amLODIPine (NORVASC) 5 mg tablet Take 1 Tab by mouth daily.   Qty: 30 Tab, Refills: 0 2.   Follow-up Information     Follow up With Specialties Details Why Contact Info    [facility], Pcp  Schedule an appointment as soon as possible for a visit in 2 days  [address]  In 2 days  [address]  [phone]        Return to ED if worse     Disposition:    Admit Note:  12:57 AM  Pt is being admitted by Dr. Solomon Stevens. The results of their tests and reason(s) for their admission have been discussed with pt and/or available family. They convey agreement and understanding for the need to be admitted and for admission diagnosis. Diagnosis     Clinical Impression:   1. Preeclampsia in postpartum period    2. Yeast infection        Attestations: This note is prepared by Estil Gitelman, acting as Scribe for MD Mariano Uribe MD : The scribe's documentation has been prepared under my direction and personally reviewed by me in its entirety. I confirm that the note above accurately reflects all work, treatment, procedures, and medical decision making performed by me. This note will not be viewable in [address]

## 2018-11-06 NOTE — PROGRESS NOTES
5210- received report and assumed care of pt who states she is starting to \"feel bad again: has headache and dizziness\". Pt instructed to call out before getting up to br.     0910- dr. Braxton Rinaldi in. poc discussed, will continue to monitor b/p and reassess after lunch, pt has been sleeping    1100- pt sleeping    1200- pt awake to eat lunch. Pt c/o headache still that got better with the tylenol but is now back. Pt sts Musa Bahena thinks it comes from stimulation from other persons\". Room is dim and sign placed on door to do not disturb. Called and spoke with dr. Braxton Rinaldi and made aware of headache and current b/p. Received order for motrin    1300- pt sleeping, awoke to assess pain. 1330- dr. Braxton Rinaldi in. B/p's reviewed. poc discussed and pt agrees to be discharged    1415-discharge instructions reviewed with pt. Pt instructed to call and come back if has back if has headaches, visual disturbances or feeling not well. Pt sts understanding and compliance. Pt signed and copy given. Pt will wait for mother to come get her.

## 2020-05-27 ENCOUNTER — OFFICE VISIT (OUTPATIENT)
Dept: GYNECOLOGY | Age: 35
End: 2020-05-27

## 2020-05-27 DIAGNOSIS — R19.00 PELVIC MASS: Primary | ICD-10-CM

## 2020-05-27 RX ORDER — NIFEDIPINE 30 MG/1
TABLET, EXTENDED RELEASE ORAL
COMMUNITY
Start: 2020-05-15

## 2020-05-27 NOTE — PROGRESS NOTES
[address] [place] [address] [address]ias [address]  P [phone]  F [phone]    Office Note  Patient ID:  Name:  Vero Fraga  MRN:  [id number]  :  1985/34 y.o. Date:  2020      HISTORY OF PRESENT ILLNESS:  Ms. Vero Fraga is a 30 y.o.  premenopausal female who presents in consultation from Dr. Wali Ardon for an enlarging complex pelvic mass. The patient reports about 2 months ago severe pain in her left lower quadrant. She reports this improved with time. Some tenderness today. Denies change in her menstrual cycle. Denies dyspareunia. Denies change in appetite or bowel habits, nausea, vomiting, constipation, diarrhea, CP, SOB, bloating, or urinary symptoms. She has one child and has had one . She is unsure if she would like to maintain her fertility, but she also states that \"you should take out anything you need to\". Pertinent PMH/PSH: Smoker 1/2 PPD     Active, no restrictions. Imaging Review:   Pelvic ultrasound 2020: Impression: Normal uterus. Normal right ovary. 10-cm complex mass in left adnexa, significantly larger when compared to prior ultrasound. No free fluid. Pelvic ultrasound 3/23/2020: Impression: Enlarged left ovary. Normal uterus. Left ovary: abnormal, difficult to distinguish ovarian tissue. Size 8.4cm x 7.9cm x 5.9cm. Lab Review:  2020:  LDH = 152 (normal)  HCB <1  AFP = 7.5 (normal)  Ca-125 = 43.6  HE-4 =60.3      ROS:  A comprehensive review of systems was negative except for that written in the History of Present Illness. , 10 point ROS    OB/GYN ROS:  Patient denies significant menstrual problems.     ECOG ndGndrndanddndend:nd nd2nd Problem List:  Patient Active Problem List    Diagnosis Date Noted    Pelvic mass 2020    Mild preeclampsia, third trimester 10/16/2018    [address] (pregnancy induced hypertension), third trimester 10/03/2018    Situational mixed anxiety and depressive disorder 2015  Vitamin D deficiency 10/20/2015    Multiple allergies 10/20/2015     PMH:  Past Medical History:   Diagnosis Date    Essential hypertension     pre HTN for the past 2 years, No meds    Genital herpes     Gestational diabetes     No Meds, Diet controlled    Gestational hypertension     Headache     Psychiatric disorder     Anxiety and depression    Scoliosis     Vitamin D deficiency     2015      PSH:  Past Surgical History:   Procedure Laterality Date    BREAST SURGERY PROCEDURE UNLISTED  2006    bilat breast reduction    HX GYN      HX HEENT      tonsillectomy    HX OTHER SURGICAL      25 yo      Social History:  Social History     Tobacco Use    Smoking status: Current Some Day Smoker     Packs/day: 0.50    Smokeless tobacco: Never Used   Substance Use Topics    Alcohol use: No     Alcohol/week: 0.0 standard drinks     Comment: rare since Dec 2014      Family History:  Family History   Problem Relation Age of Onset    Hypertension Father     Heart Disease Maternal Aunt     Cancer Maternal Uncle     Cancer Paternal Uncle     Heart Disease Paternal Uncle     Heart Disease Maternal Grandmother     Heart Disease Paternal Grandmother     Heart Disease Paternal Grandfather     Breast Cancer Other         maternal great aunt      Medications: (reviewed)  Current Outpatient Medications   Medication Sig    NIFEdipine ER (PROCARDIA XL) 30 mg ER tablet TAKE 1 TABLET BY MOUTH ONCE DAILY IN THE MORNING    escitalopram oxalate (LEXAPRO) 10 mg tablet Take 20 mg by mouth daily.  amLODIPine (NORVASC) 5 mg tablet Take 1 Tab by mouth daily.  ibuprofen (MOTRIN) 800 mg tablet Take 1 Tab by mouth every eight (8) hours as needed for Pain.  oxyCODONE-acetaminophen (PERCOCET) 5-325 mg per tablet Take 1 Tab by mouth every four (4) hours as needed. Max Daily Amount: 6 Tabs. No current facility-administered medications for this visit.       Allergies: (reviewed)  Allergies   Allergen Reactions    Banana Itching    Carrot Itching    Peach (Prunus Persica) Itching    Tree Nut Itching    Watermelon Itching    Zoloft [Sertraline] Rash        Gyn History:   Last pap: 3/9/2020, normal but HPV positive. Unsure of 16/18 status. History of abnormal pap: denies  History of STI: herpes  Menses: normal  Contraception: none currently. OBJECTIVE:    Physical Exam:  VITAL SIGNS: There were no vitals filed for this visit. There is no height or weight on file to calculate BMI. GENERAL ERIC: Conversant, alert, oriented. No acute distress. HEENT: HEENT. No thyroid enlargement. No JVD. Neck: Supple without restrictions. RESPIRATORY: Clear to auscultation and percussion to the bases. No CVAT. CARDIOVASC: RRR without murmur/rub. GASTROINT: soft, non-tender, without masses or organomegaly   MUSCULOSKEL: no joint tenderness, deformity or swelling       EXTREMITIES: extremities normal, atraumatic, no cyanosis or edema   PELVIC: Exam chaperoned by nurse. Normal appearing external genitalia. On speculum exam, normal appearing vagina and cervix. On bimanual exam, the cervix and uterus are normal size and mobile. There is a 12cm mass in the pelvis, which seems to be from the left but that is unclear as it is centrally located. Smooth and mobile   RECTAL: deferred   BALWINDER SURVEY: No suspicious lymphadenopathy or edema noted. NEURO: Grossly intact. No acute deficit.        Lab Date as available:    Lab Results   Component Value Date/Time    WBC 9.0 11/06/2018 06:06 AM    HGB 12.0 11/06/2018 06:06 AM    HCT 35.6 11/06/2018 06:06 AM    PLATELET 924 82/34/1160 06:06 AM    MCV 93.9 11/06/2018 06:06 AM     Lab Results   Component Value Date/Time    Sodium 140 11/06/2018 06:46 AM    Potassium 3.8 11/06/2018 06:46 AM    Chloride 105 11/06/2018 06:46 AM    CO2 27 11/06/2018 06:46 AM    Anion gap 8 11/06/2018 06:46 AM    Glucose 91 11/06/2018 06:46 AM    BUN 7 11/06/2018 06:46 AM    Creatinine 0.68 11/06/2018 06:46 AM BUN/Creatinine ratio 10 (L) 11/06/2018 06:46 AM    GFR est AA >60 11/06/2018 06:46 AM    GFR est non-AA >60 11/06/2018 06:46 AM    Calcium 8.7 11/06/2018 06:46 AM         IMPRESSION/PLAN:    Ms. Elsa Nguyen is a 30 y.o. female with a working diagnosis of an enlarging complex pelvic mass, 10-12cm in size. Ca-125 43. Elevated ISAIAS, 11%    Problems:     Patient Active Problem List    Diagnosis Date Noted    Pelvic mass 05/29/2020    Mild preeclampsia, third trimester 10/16/2018    PIH (pregnancy induced hypertension), third trimester 10/03/2018    Situational mixed anxiety and depressive disorder 12/02/2015    Vitamin D deficiency 10/20/2015    Multiple allergies 10/20/2015       I reviewed Ms. Rubio Hernandez course to date, including her medical records, recent studies, physical exam, and review of symptoms. Counseled patient regarding benign, borderline, and malignant conditions. Given the complexity and size of the mass, I have recommended surgical removal. Reviewed the risks, benefits, indications, and alternatives to surgery. Plan for diagnostic laparoscopy, resection of mass, USO, possible exploratory laparotomy with frozen pathology and staging if indicated. The patient is unsure if she would like to maintain her fertility. She reports that it would be nice to have the option, but then explicitly states multiple times that I should remove what I need to. She states \"I have one child. I want to be alive for that child\". If frozen pathology consistent with a borderline tumor, will plan for additional peritoneal biopsies and omentectomy. If frozen pathology consistent with malignancy, will plan for peritoneal biopsies, omentectomy, pelvic and possible para-aortic lymph node dissection, and debulking if indicated. If her uterus and contralateral tube/ovary are not involved with tumor or if her pathology is benign, then we will plan to preserve her uterus and contralateral tube/ovary.  However, if they are involved with tumor then we will proceed with hysterectomy/BSO. Preoperatively, will collect CBCD, CMP, CXR, and EKG. Posted for surgery on 6/4/2020. All questions and concerns were addressed with the patient and she is comfortable with the plan.       Defined Sensitive Document    >50% of total time allocated to visit dedicated to counseling, 50 minutes total.    Signed By: Charlene Talbot MD     5/29/2020/8:26 AM

## 2020-05-27 NOTE — LETTER
5/29/20 Patient: Vero Fraga YOB: 1985 Date of Visit: 5/27/2020 Pcp [facility] 
[address] [address] VIA Facsimile: [phone] Dear Laura Lovell MD, Thank you for referring Ms. Vero Fraga to [facility] for evaluation. My notes for this consultation are attached. If you have questions, please do not hesitate to call me. I look forward to following your patient along with you.  
 
 
Sincerely, 
 
Andrew Hartley MD

## 2020-05-27 NOTE — H&P (VIEW-ONLY)
[address] [facility], [address] [place], [address] [facility] 
P [phone]  F [phone] Office Note Patient ID: 
Name:  Zeinab Liao MRN:  [id number] :  1985/34 y.o. Date:  2020 HISTORY OF PRESENT ILLNESS: 
Ms. Zeinab Liao is a 30 y.o.  premenopausal female who presents in consultation from Dr. David Fraser for an enlarging complex pelvic mass. The patient reports about 2 months ago severe pain in her left lower quadrant. She reports this improved with time. Some tenderness today. Denies change in her menstrual cycle. Denies dyspareunia. Denies change in appetite or bowel habits, nausea, vomiting, constipation, diarrhea, CP, SOB, bloating, or urinary symptoms. She has one child and has had one . She is unsure if she would like to maintain her fertility, but she also states that \"you should take out anything you need to\". Pertinent PMH/PSH: Smoker 1/2 PPD Active, no restrictions. Imaging Review:  
Pelvic ultrasound 2020: Impression: Normal uterus. Normal right ovary. 10-cm complex mass in left adnexa, significantly larger when compared to prior ultrasound. No free fluid. Pelvic ultrasound 3/23/2020: Impression: Enlarged left ovary. Normal uterus. Left ovary: abnormal, difficult to distinguish ovarian tissue. Size 8.4cm x 7.9cm x 5.9cm. Lab Review: 
2020: 
LDH = 152 (normal) HCB <1 
AFP = 7.5 (normal) Ca-125 = 43.6 HE-4 =60.3 ROS: 
A comprehensive review of systems was negative except for that written in the History of Present Illness. , 10 point ROS 
 
OB/GYN ROS: 
Patient denies significant menstrual problems. ECOG ndGndrndanddndend:nd nd2nd Problem List: 
Patient Active Problem List  
 Diagnosis Date Noted  Pelvic mass 2020  Mild preeclampsia, third trimester 10/16/2018  
 PIH (pregnancy induced hypertension), third trimester 10/03/2018  Situational mixed anxiety and depressive disorder 2015  Vitamin D deficiency 10/20/2015  Multiple allergies 10/20/2015 PMH: 
Past Medical History:  
Diagnosis Date  Essential hypertension   
 pre HTN for the past 2 years, No meds  Genital herpes  Gestational diabetes No Meds, Diet controlled  Gestational hypertension  Headache  Psychiatric disorder Anxiety and depression  Scoliosis  Vitamin D deficiency 2015 PSH: 
Past Surgical History:  
Procedure Laterality Date  BREAST SURGERY PROCEDURE UNLISTED  2006  
 bilat breast reduction  HX GYN    
 HX HEENT    
 tonsillectomy  HX OTHER SURGICAL    
 27 yo Social History: 
Social History Tobacco Use  Smoking status: Current Some Day Smoker Packs/day: 0.50  Smokeless tobacco: Never Used Substance Use Topics  Alcohol use: No  
  Alcohol/week: 0.0 standard drinks Comment: rare since Dec 2014 Family History: 
Family History Problem Relation Age of Onset  Hypertension Father  Heart Disease Maternal Aunt  Cancer Maternal Uncle  Cancer Paternal Uncle  Heart Disease Paternal Uncle  Heart Disease Maternal Grandmother  Heart Disease Paternal Grandmother  Heart Disease Paternal Grandfather  Breast Cancer Other   
     maternal great aunt Medications: (reviewed) Current Outpatient Medications Medication Sig  
 NIFEdipine ER (PROCARDIA XL) 30 mg ER tablet TAKE 1 TABLET BY MOUTH ONCE DAILY IN THE MORNING  
 escitalopram oxalate (LEXAPRO) 10 mg tablet Take 20 mg by mouth daily.  amLODIPine (NORVASC) 5 mg tablet Take 1 Tab by mouth daily.  ibuprofen (MOTRIN) 800 mg tablet Take 1 Tab by mouth every eight (8) hours as needed for Pain.  oxyCODONE-acetaminophen (PERCOCET) 5-325 mg per tablet Take 1 Tab by mouth every four (4) hours as needed. Max Daily Amount: 6 Tabs. No current facility-administered medications for this visit. Allergies: (reviewed) Allergies Allergen Reactions  Banana Itching  Carrot Itching  Peach (Prunus Persica) Itching  Tree Nut Itching  Watermelon Itching  Zoloft [Sertraline] Rash Gyn History:  
Last pap: 3/9/2020, normal but HPV positive. Unsure of 16/18 status. History of abnormal pap: denies History of STI: herpes Menses: normal 
Contraception: none currently. OBJECTIVE: 
 
Physical Exam: VITAL SIGNS: There were no vitals filed for this visit. There is no height or weight on file to calculate BMI. GENERAL ERIC: Conversant, alert, oriented. No acute distress. HEENT: HEENT. No thyroid enlargement. No JVD. Neck: Supple without restrictions. RESPIRATORY: Clear to auscultation and percussion to the bases. No CVAT. CARDIOVASC: RRR without murmur/rub. GASTROINT: soft, non-tender, without masses or organomegaly MUSCULOSKEL: no joint tenderness, deformity or swelling EXTREMITIES: extremities normal, atraumatic, no cyanosis or edema PELVIC: Exam chaperoned by nurse. Normal appearing external genitalia. On speculum exam, normal appearing vagina and cervix. On bimanual exam, the cervix and uterus are normal size and mobile. There is a 12cm mass in the pelvis, which seems to be from the left but that is unclear as it is centrally located. Smooth and mobile RECTAL: deferred BALWINDER SURVEY: No suspicious lymphadenopathy or edema noted. NEURO: Grossly intact. No acute deficit. Lab Date as available: 
 
Lab Results Component Value Date/Time WBC 9.0 11/06/2018 06:06 AM  
 HGB 12.0 11/06/2018 06:06 AM  
 HCT 35.6 11/06/2018 06:06 AM  
 PLATELET 106 32/77/7021 06:06 AM  
 MCV 93.9 11/06/2018 06:06 AM  
 
Lab Results Component Value Date/Time  Sodium 140 11/06/2018 06:46 AM  
 Potassium 3.8 11/06/2018 06:46 AM  
 Chloride 105 11/06/2018 06:46 AM  
 CO2 27 11/06/2018 06:46 AM  
 Anion gap 8 11/06/2018 06:46 AM  
 Glucose 91 11/06/2018 06:46 AM  
 BUN 7 11/06/2018 06:46 AM  
 Creatinine 0.68 11/06/2018 06:46 AM  
 BUN/Creatinine ratio 10 (L) 11/06/2018 06:46 AM  
 GFR est AA >60 11/06/2018 06:46 AM  
 GFR est non-AA >60 11/06/2018 06:46 AM  
 Calcium 8.7 11/06/2018 06:46 AM  
 
 
 
IMPRESSION/PLAN: 
 
Ms. Juvencio Jesus is a 30 y.o. female with a working diagnosis of an enlarging complex pelvic mass, 10-12cm in size. Ca-125 43. Elevated ISAIAS, 11% Problems: 
  
Patient Active Problem List  
 Diagnosis Date Noted  Pelvic mass 05/29/2020  Mild preeclampsia, third trimester 10/16/2018  
 PIH (pregnancy induced hypertension), third trimester 10/03/2018  Situational mixed anxiety and depressive disorder 12/02/2015  Vitamin D deficiency 10/20/2015  Multiple allergies 10/20/2015 I reviewed Ms. Iggy Bass course to date, including her medical records, recent studies, physical exam, and review of symptoms. Counseled patient regarding benign, borderline, and malignant conditions. Given the complexity and size of the mass, I have recommended surgical removal. Reviewed the risks, benefits, indications, and alternatives to surgery. Plan for diagnostic laparoscopy, resection of mass, USO, possible exploratory laparotomy with frozen pathology and staging if indicated. The patient is unsure if she would like to maintain her fertility. She reports that it would be nice to have the option, but then explicitly states multiple times that I should remove what I need to. She states \"I have one child. I want to be alive for that child\". If frozen pathology consistent with a borderline tumor, will plan for additional peritoneal biopsies and omentectomy. If frozen pathology consistent with malignancy, will plan for peritoneal biopsies, omentectomy, pelvic and possible para-aortic lymph node dissection, and debulking if indicated.  If her uterus and contralateral tube/ovary are not involved with tumor or if her pathology is benign, then we will plan to preserve her uterus and contralateral tube/ovary. However, if they are involved with tumor then we will proceed with hysterectomy/BSO. Preoperatively, will collect CBCD, CMP, CXR, and EKG. Posted for surgery on 6/4/2020. All questions and concerns were addressed with the patient and she is comfortable with the plan.  
 
 
Defined Sensitive Document 
 
>50% of total time allocated to visit dedicated to counseling, 50 minutes total. 
 
Signed By: Yessi Perkins MD   
 5/29/2020/8:26 AM

## 2020-05-27 NOTE — PROGRESS NOTES
New patient referred by Dr. Wali Ardon for consult for SSO, elevated lab work, pt states she has had some slight left sided abdominal discomfort, she states the pain on her left side was \"very painful\" 2 months ago    1. Have you been to the ER, urgent care clinic since your last visit? Hospitalized since your last visit? 2. Have you seen or consulted any other health care providers outside of the [address] [place] [address] since your last visit? Include any pap smears or colon screening.

## 2020-05-29 ENCOUNTER — HOSPITAL ENCOUNTER (OUTPATIENT)
Dept: PREADMISSION TESTING | Age: 35
Discharge: HOME | End: 2020-05-29
Payer: MEDICAID

## 2020-05-29 ENCOUNTER — HOSPITAL ENCOUNTER (OUTPATIENT)
Dept: GENERAL RADIOLOGY | Age: 35
Discharge: HOME | End: 2020-05-29
Attending: OBSTETRICS & GYNECOLOGY
Payer: MEDICAID

## 2020-05-29 VITALS
SYSTOLIC BLOOD PRESSURE: 136 MMHG | HEART RATE: 89 BPM | BODY MASS INDEX: 29.25 KG/M2 | DIASTOLIC BLOOD PRESSURE: 91 MMHG | WEIGHT: 182 LBS | TEMPERATURE: 98.3 F | HEIGHT: 66 IN

## 2020-05-29 PROBLEM — R19.00 PELVIC MASS: Status: ACTIVE | Noted: 2020-05-29

## 2020-05-29 LAB
ALBUMIN SERPL-MCNC: 4.3 G/DL (ref 3.5–5)
ALBUMIN/GLOB SERPL: 1.3 {RATIO} (ref 1.1–2.2)
ALP SERPL-CCNC: 111 U/L (ref 45–117)
ALT SERPL-CCNC: 72 U/L (ref 12–78)
ANION GAP SERPL CALC-SCNC: 6 MMOL/L (ref 5–15)
AST SERPL-CCNC: 27 U/L (ref 15–37)
ATRIAL RATE: 69 BPM
BASOPHILS # BLD: 0 K/UL (ref 0–0.1)
BASOPHILS NFR BLD: 1 % (ref 0–1)
BILIRUB SERPL-MCNC: 0.3 MG/DL (ref 0.2–1)
BUN SERPL-MCNC: 6 MG/DL (ref 6–20)
BUN/CREAT SERPL: 7 (ref 12–20)
CALCIUM SERPL-MCNC: 9.7 MG/DL (ref 8.5–10.1)
CALCULATED P AXIS, ECG09: 23 DEGREES
CALCULATED R AXIS, ECG10: 27 DEGREES
CALCULATED T AXIS, ECG11: 27 DEGREES
CHLORIDE SERPL-SCNC: 106 MMOL/L (ref 97–108)
CO2 SERPL-SCNC: 26 MMOL/L (ref 21–32)
CREAT SERPL-MCNC: 0.84 MG/DL (ref 0.55–1.02)
DIAGNOSIS, 93000: NORMAL
DIFFERENTIAL METHOD BLD: NORMAL
EOSINOPHIL # BLD: 0.4 K/UL (ref 0–0.4)
EOSINOPHIL NFR BLD: 6 % (ref 0–7)
ERYTHROCYTE [DISTWIDTH] IN BLOOD BY AUTOMATED COUNT: 12.2 % (ref 11.5–14.5)
GLOBULIN SER CALC-MCNC: 3.3 G/DL (ref 2–4)
GLUCOSE SERPL-MCNC: 136 MG/DL (ref 65–100)
HCT VFR BLD AUTO: 44.6 % (ref 35–47)
HGB BLD-MCNC: 14.8 G/DL (ref 11.5–16)
IMM GRANULOCYTES # BLD AUTO: 0 K/UL (ref 0–0.04)
IMM GRANULOCYTES NFR BLD AUTO: 0 % (ref 0–0.5)
LYMPHOCYTES # BLD: 2.2 K/UL (ref 0.8–3.5)
LYMPHOCYTES NFR BLD: 29 % (ref 12–49)
MCH RBC QN AUTO: 30.3 PG (ref 26–34)
MCHC RBC AUTO-ENTMCNC: 33.2 G/DL (ref 30–36.5)
MCV RBC AUTO: 91.4 FL (ref 80–99)
MONOCYTES # BLD: 0.5 K/UL (ref 0–1)
MONOCYTES NFR BLD: 6 % (ref 5–13)
NEUTS SEG # BLD: 4.6 K/UL (ref 1.8–8)
NEUTS SEG NFR BLD: 58 % (ref 32–75)
NRBC # BLD: 0 K/UL (ref 0–0.01)
NRBC BLD-RTO: 0 PER 100 WBC
P-R INTERVAL, ECG05: 174 MS
PLATELET # BLD AUTO: 339 K/UL (ref 150–400)
PMV BLD AUTO: 10.7 FL (ref 8.9–12.9)
POTASSIUM SERPL-SCNC: 3.7 MMOL/L (ref 3.5–5.1)
PROT SERPL-MCNC: 7.6 G/DL (ref 6.4–8.2)
Q-T INTERVAL, ECG07: 394 MS
QRS DURATION, ECG06: 76 MS
QTC CALCULATION (BEZET), ECG08: 422 MS
RBC # BLD AUTO: 4.88 M/UL (ref 3.8–5.2)
SODIUM SERPL-SCNC: 138 MMOL/L (ref 136–145)
VENTRICULAR RATE, ECG03: 69 BPM
WBC # BLD AUTO: 7.8 K/UL (ref 3.6–11)

## 2020-05-29 PROCEDURE — 85025 COMPLETE CBC W/AUTO DIFF WBC: CPT

## 2020-05-29 PROCEDURE — 71046 X-RAY EXAM CHEST 2 VIEWS: CPT

## 2020-05-29 PROCEDURE — 93005 ELECTROCARDIOGRAM TRACING: CPT

## 2020-05-29 PROCEDURE — 80053 COMPREHEN METABOLIC PANEL: CPT

## 2020-05-29 NOTE — PERIOP NOTES
Preoperative instructions reviewed with patient. Patient given SSI infection FAQS sheet. Given two bottles of skin prep chlorhexidine soap; given written and verbal instructions on use. Patient was given the opportunity to ask questions on the information provided. INFORMATION AND INSTRUCTIONS WERE GIVEN TO THE PATIENT FOR COVID 19 TESTING PRIOR TO SURGERY WITH THE OPPORTUNITY FOR QUESTIONS. PATIENT VERBALIZED UNDERSTANDING. Patient instructed to obtain chest X-ray at [address] [place] [address] upon leaving PAT department.

## 2020-05-31 ENCOUNTER — HOSPITAL ENCOUNTER (OUTPATIENT)
Dept: PREADMISSION TESTING | Age: 35
Discharge: HOME | End: 2020-05-31
Payer: MEDICAID

## 2020-05-31 DIAGNOSIS — Z20.822 ENCOUNTER FOR LABORATORY TESTING FOR COVID-19 VIRUS: ICD-10-CM

## 2020-05-31 PROCEDURE — 87635 SARS-COV-2 COVID-19 AMP PRB: CPT

## 2020-06-01 LAB — SARS-COV-2, COV2NT: NOT DETECTED

## 2020-06-03 ENCOUNTER — ANESTHESIA EVENT (OUTPATIENT)
Dept: SURGERY | Age: 35
End: 2020-06-03
Payer: MEDICAID

## 2020-06-04 ENCOUNTER — ANESTHESIA (OUTPATIENT)
Dept: SURGERY | Age: 35
End: 2020-06-04
Payer: MEDICAID

## 2020-06-04 ENCOUNTER — HOSPITAL ENCOUNTER (OUTPATIENT)
Age: 35
Setting detail: OUTPATIENT SURGERY
Discharge: HOME | End: 2020-06-04
Attending: OBSTETRICS & GYNECOLOGY | Admitting: OBSTETRICS & GYNECOLOGY
Payer: MEDICAID

## 2020-06-04 VITALS
OXYGEN SATURATION: 100 % | SYSTOLIC BLOOD PRESSURE: 108 MMHG | HEART RATE: 65 BPM | RESPIRATION RATE: 11 BRPM | BODY MASS INDEX: 29.25 KG/M2 | HEIGHT: 66 IN | TEMPERATURE: 97.9 F | WEIGHT: 182 LBS | DIASTOLIC BLOOD PRESSURE: 64 MMHG

## 2020-06-04 DIAGNOSIS — G89.18 POSTOPERATIVE PAIN: Primary | ICD-10-CM

## 2020-06-04 DIAGNOSIS — R19.00 PELVIC MASS: ICD-10-CM

## 2020-06-04 LAB — HCG UR QL: NEGATIVE

## 2020-06-04 PROCEDURE — 76060000034 HC ANESTHESIA 1.5 TO 2 HR: Performed by: OBSTETRICS & GYNECOLOGY

## 2020-06-04 PROCEDURE — 77030022703 HC LIGASURE  BLNT LAPSCP SEAL COVD -E: Performed by: OBSTETRICS & GYNECOLOGY

## 2020-06-04 PROCEDURE — 74011000250 HC RX REV CODE- 250: Performed by: OBSTETRICS & GYNECOLOGY

## 2020-06-04 PROCEDURE — 77030031139 HC SUT VCRL2 J&J -A: Performed by: OBSTETRICS & GYNECOLOGY

## 2020-06-04 PROCEDURE — 76010000153 HC OR TIME 1.5 TO 2 HR: Performed by: OBSTETRICS & GYNECOLOGY

## 2020-06-04 PROCEDURE — 77030010507 HC ADH SKN DERMBND J&J -B: Performed by: OBSTETRICS & GYNECOLOGY

## 2020-06-04 PROCEDURE — 77030020829: Performed by: OBSTETRICS & GYNECOLOGY

## 2020-06-04 PROCEDURE — 77030012799 HC TRCR GELPRT BLN AMR -B: Performed by: OBSTETRICS & GYNECOLOGY

## 2020-06-04 PROCEDURE — 76210000006 HC OR PH I REC 0.5 TO 1 HR: Performed by: OBSTETRICS & GYNECOLOGY

## 2020-06-04 PROCEDURE — 74011000258 HC RX REV CODE- 258: Performed by: OBSTETRICS & GYNECOLOGY

## 2020-06-04 PROCEDURE — 77030002933 HC SUT MCRYL J&J -A: Performed by: OBSTETRICS & GYNECOLOGY

## 2020-06-04 PROCEDURE — 74011250636 HC RX REV CODE- 250/636: Performed by: NURSE ANESTHETIST, CERTIFIED REGISTERED

## 2020-06-04 PROCEDURE — 77030026438 HC STYL ET INTUB CARD -A: Performed by: ANESTHESIOLOGY

## 2020-06-04 PROCEDURE — 74011250637 HC RX REV CODE- 250/637: Performed by: ANESTHESIOLOGY

## 2020-06-04 PROCEDURE — 74011000250 HC RX REV CODE- 250: Performed by: NURSE ANESTHETIST, CERTIFIED REGISTERED

## 2020-06-04 PROCEDURE — 74011250636 HC RX REV CODE- 250/636: Performed by: ANESTHESIOLOGY

## 2020-06-04 PROCEDURE — 76210000021 HC REC RM PH II 0.5 TO 1 HR: Performed by: OBSTETRICS & GYNECOLOGY

## 2020-06-04 PROCEDURE — 88307 TISSUE EXAM BY PATHOLOGIST: CPT

## 2020-06-04 PROCEDURE — 77030011640 HC PAD GRND REM COVD -A: Performed by: OBSTETRICS & GYNECOLOGY

## 2020-06-04 PROCEDURE — 77030020263 HC SOL INJ SOD CL0.9% LFCR 1000ML: Performed by: OBSTETRICS & GYNECOLOGY

## 2020-06-04 PROCEDURE — 77030008684 HC TU ET CUF COVD -B: Performed by: ANESTHESIOLOGY

## 2020-06-04 PROCEDURE — 77030018836 HC SOL IRR NACL ICUM -A: Performed by: OBSTETRICS & GYNECOLOGY

## 2020-06-04 PROCEDURE — 77030010031 HC SCIS ENDOSC MPLR J&J -C: Performed by: OBSTETRICS & GYNECOLOGY

## 2020-06-04 PROCEDURE — 77030033162 HC PCH SPEC RTVR ENDOSC AMR -B: Performed by: OBSTETRICS & GYNECOLOGY

## 2020-06-04 PROCEDURE — 81025 URINE PREGNANCY TEST: CPT

## 2020-06-04 PROCEDURE — 77030040361 HC SLV COMPR DVT MDII -B: Performed by: OBSTETRICS & GYNECOLOGY

## 2020-06-04 PROCEDURE — 77030012770 HC TRCR OPT FX AMR -B: Performed by: OBSTETRICS & GYNECOLOGY

## 2020-06-04 PROCEDURE — 77030040830 HC CATH URETH FOL MDII -A: Performed by: OBSTETRICS & GYNECOLOGY

## 2020-06-04 PROCEDURE — 77030008756 HC TU IRR SUC STRY -B: Performed by: OBSTETRICS & GYNECOLOGY

## 2020-06-04 PROCEDURE — 88112 CYTOPATH CELL ENHANCE TECH: CPT

## 2020-06-04 PROCEDURE — 88331 PATH CONSLTJ SURG 1 BLK 1SPC: CPT

## 2020-06-04 RX ORDER — HYDROMORPHONE HYDROCHLORIDE 1 MG/ML
0.2 INJECTION, SOLUTION INTRAMUSCULAR; INTRAVENOUS; SUBCUTANEOUS
Status: DISCONTINUED | OUTPATIENT
Start: 2020-06-04 | End: 2020-06-04 | Stop reason: HOSPADM

## 2020-06-04 RX ORDER — NEOSTIGMINE METHYLSULFATE 1 MG/ML
INJECTION, SOLUTION INTRAVENOUS AS NEEDED
Status: DISCONTINUED | OUTPATIENT
Start: 2020-06-04 | End: 2020-06-04 | Stop reason: HOSPADM

## 2020-06-04 RX ORDER — DOCUSATE SODIUM 100 MG/1
100 CAPSULE, LIQUID FILLED ORAL 2 TIMES DAILY
Qty: 40 CAP | Refills: 1 | Status: SHIPPED | OUTPATIENT
Start: 2020-06-04

## 2020-06-04 RX ORDER — FENTANYL CITRATE 50 UG/ML
50 INJECTION, SOLUTION INTRAMUSCULAR; INTRAVENOUS AS NEEDED
Status: DISCONTINUED | OUTPATIENT
Start: 2020-06-04 | End: 2020-06-04 | Stop reason: HOSPADM

## 2020-06-04 RX ORDER — ROCURONIUM BROMIDE 10 MG/ML
INJECTION, SOLUTION INTRAVENOUS AS NEEDED
Status: DISCONTINUED | OUTPATIENT
Start: 2020-06-04 | End: 2020-06-04 | Stop reason: HOSPADM

## 2020-06-04 RX ORDER — DIPHENHYDRAMINE HYDROCHLORIDE 50 MG/ML
12.5 INJECTION, SOLUTION INTRAMUSCULAR; INTRAVENOUS AS NEEDED
Status: DISCONTINUED | OUTPATIENT
Start: 2020-06-04 | End: 2020-06-04 | Stop reason: HOSPADM

## 2020-06-04 RX ORDER — ACETAMINOPHEN 325 MG/1
650 TABLET ORAL ONCE
Status: COMPLETED | OUTPATIENT
Start: 2020-06-04 | End: 2020-06-04

## 2020-06-04 RX ORDER — SODIUM CHLORIDE 9 MG/ML
50 INJECTION, SOLUTION INTRAVENOUS CONTINUOUS
Status: DISCONTINUED | OUTPATIENT
Start: 2020-06-04 | End: 2020-06-04 | Stop reason: HOSPADM

## 2020-06-04 RX ORDER — IBUPROFEN 200 MG
600 TABLET ORAL
Qty: 50 TAB | Refills: 0 | Status: SHIPPED | OUTPATIENT
Start: 2020-06-04

## 2020-06-04 RX ORDER — MORPHINE SULFATE 10 MG/ML
2 INJECTION, SOLUTION INTRAMUSCULAR; INTRAVENOUS
Status: DISCONTINUED | OUTPATIENT
Start: 2020-06-04 | End: 2020-06-04 | Stop reason: HOSPADM

## 2020-06-04 RX ORDER — SODIUM CHLORIDE, SODIUM LACTATE, POTASSIUM CHLORIDE, CALCIUM CHLORIDE 600; 310; 30; 20 MG/100ML; MG/100ML; MG/100ML; MG/100ML
INJECTION, SOLUTION INTRAVENOUS
Status: DISCONTINUED | OUTPATIENT
Start: 2020-06-04 | End: 2020-06-04 | Stop reason: HOSPADM

## 2020-06-04 RX ORDER — FENTANYL CITRATE 50 UG/ML
INJECTION, SOLUTION INTRAMUSCULAR; INTRAVENOUS AS NEEDED
Status: DISCONTINUED | OUTPATIENT
Start: 2020-06-04 | End: 2020-06-04 | Stop reason: HOSPADM

## 2020-06-04 RX ORDER — OXYCODONE AND ACETAMINOPHEN 5; 325 MG/1; MG/1
1 TABLET ORAL AS NEEDED
Status: DISCONTINUED | OUTPATIENT
Start: 2020-06-04 | End: 2020-06-04 | Stop reason: HOSPADM

## 2020-06-04 RX ORDER — LIDOCAINE HYDROCHLORIDE 10 MG/ML
0.1 INJECTION, SOLUTION EPIDURAL; INFILTRATION; INTRACAUDAL; PERINEURAL AS NEEDED
Status: DISCONTINUED | OUTPATIENT
Start: 2020-06-04 | End: 2020-06-04 | Stop reason: HOSPADM

## 2020-06-04 RX ORDER — GLYCOPYRROLATE 0.2 MG/ML
INJECTION INTRAMUSCULAR; INTRAVENOUS AS NEEDED
Status: DISCONTINUED | OUTPATIENT
Start: 2020-06-04 | End: 2020-06-04 | Stop reason: HOSPADM

## 2020-06-04 RX ORDER — TRAMADOL HYDROCHLORIDE 50 MG/1
50-100 TABLET ORAL
Qty: 15 TAB | Refills: 0 | Status: SHIPPED | OUTPATIENT
Start: 2020-06-04 | End: 2020-06-07

## 2020-06-04 RX ORDER — ACETAMINOPHEN 325 MG/1
650 TABLET ORAL
Qty: 60 TAB | Refills: 0 | Status: SHIPPED | OUTPATIENT
Start: 2020-06-04 | End: 2020-06-08

## 2020-06-04 RX ORDER — MIDAZOLAM HYDROCHLORIDE 1 MG/ML
INJECTION, SOLUTION INTRAMUSCULAR; INTRAVENOUS AS NEEDED
Status: DISCONTINUED | OUTPATIENT
Start: 2020-06-04 | End: 2020-06-04 | Stop reason: HOSPADM

## 2020-06-04 RX ORDER — EPHEDRINE SULFATE/0.9% NACL/PF 50 MG/5 ML
5 SYRINGE (ML) INTRAVENOUS AS NEEDED
Status: DISCONTINUED | OUTPATIENT
Start: 2020-06-04 | End: 2020-06-04 | Stop reason: HOSPADM

## 2020-06-04 RX ORDER — MIDAZOLAM HYDROCHLORIDE 1 MG/ML
1 INJECTION, SOLUTION INTRAMUSCULAR; INTRAVENOUS AS NEEDED
Status: DISCONTINUED | OUTPATIENT
Start: 2020-06-04 | End: 2020-06-04 | Stop reason: HOSPADM

## 2020-06-04 RX ORDER — BUPIVACAINE HYDROCHLORIDE 5 MG/ML
INJECTION, SOLUTION EPIDURAL; INTRACAUDAL AS NEEDED
Status: DISCONTINUED | OUTPATIENT
Start: 2020-06-04 | End: 2020-06-04 | Stop reason: HOSPADM

## 2020-06-04 RX ORDER — HYDROMORPHONE HYDROCHLORIDE 2 MG/ML
INJECTION, SOLUTION INTRAMUSCULAR; INTRAVENOUS; SUBCUTANEOUS AS NEEDED
Status: DISCONTINUED | OUTPATIENT
Start: 2020-06-04 | End: 2020-06-04 | Stop reason: HOSPADM

## 2020-06-04 RX ORDER — SODIUM CHLORIDE, SODIUM LACTATE, POTASSIUM CHLORIDE, CALCIUM CHLORIDE 600; 310; 30; 20 MG/100ML; MG/100ML; MG/100ML; MG/100ML
125 INJECTION, SOLUTION INTRAVENOUS CONTINUOUS
Status: DISCONTINUED | OUTPATIENT
Start: 2020-06-04 | End: 2020-06-04 | Stop reason: HOSPADM

## 2020-06-04 RX ORDER — SUCCINYLCHOLINE CHLORIDE 20 MG/ML
INJECTION INTRAMUSCULAR; INTRAVENOUS AS NEEDED
Status: DISCONTINUED | OUTPATIENT
Start: 2020-06-04 | End: 2020-06-04 | Stop reason: HOSPADM

## 2020-06-04 RX ORDER — PROPOFOL 10 MG/ML
INJECTION, EMULSION INTRAVENOUS AS NEEDED
Status: DISCONTINUED | OUTPATIENT
Start: 2020-06-04 | End: 2020-06-04 | Stop reason: HOSPADM

## 2020-06-04 RX ORDER — KETOROLAC TROMETHAMINE 30 MG/ML
INJECTION, SOLUTION INTRAMUSCULAR; INTRAVENOUS AS NEEDED
Status: DISCONTINUED | OUTPATIENT
Start: 2020-06-04 | End: 2020-06-04 | Stop reason: HOSPADM

## 2020-06-04 RX ORDER — SODIUM CHLORIDE 9 MG/ML
1000 INJECTION, SOLUTION INTRAVENOUS CONTINUOUS
Status: DISCONTINUED | OUTPATIENT
Start: 2020-06-04 | End: 2020-06-04 | Stop reason: HOSPADM

## 2020-06-04 RX ORDER — SODIUM CHLORIDE 0.9 % (FLUSH) 0.9 %
5-40 SYRINGE (ML) INJECTION EVERY 8 HOURS
Status: DISCONTINUED | OUTPATIENT
Start: 2020-06-04 | End: 2020-06-04 | Stop reason: HOSPADM

## 2020-06-04 RX ORDER — SODIUM CHLORIDE 0.9 % (FLUSH) 0.9 %
5-40 SYRINGE (ML) INJECTION AS NEEDED
Status: DISCONTINUED | OUTPATIENT
Start: 2020-06-04 | End: 2020-06-04 | Stop reason: HOSPADM

## 2020-06-04 RX ORDER — FENTANYL CITRATE 50 UG/ML
25 INJECTION, SOLUTION INTRAMUSCULAR; INTRAVENOUS
Status: DISCONTINUED | OUTPATIENT
Start: 2020-06-04 | End: 2020-06-04 | Stop reason: HOSPADM

## 2020-06-04 RX ORDER — ONDANSETRON 2 MG/ML
INJECTION INTRAMUSCULAR; INTRAVENOUS AS NEEDED
Status: DISCONTINUED | OUTPATIENT
Start: 2020-06-04 | End: 2020-06-04 | Stop reason: HOSPADM

## 2020-06-04 RX ORDER — MIDAZOLAM HYDROCHLORIDE 1 MG/ML
0.5 INJECTION, SOLUTION INTRAMUSCULAR; INTRAVENOUS
Status: DISCONTINUED | OUTPATIENT
Start: 2020-06-04 | End: 2020-06-04 | Stop reason: HOSPADM

## 2020-06-04 RX ORDER — DEXAMETHASONE SODIUM PHOSPHATE 4 MG/ML
INJECTION, SOLUTION INTRA-ARTICULAR; INTRALESIONAL; INTRAMUSCULAR; INTRAVENOUS; SOFT TISSUE AS NEEDED
Status: DISCONTINUED | OUTPATIENT
Start: 2020-06-04 | End: 2020-06-04 | Stop reason: HOSPADM

## 2020-06-04 RX ORDER — EPHEDRINE SULFATE/0.9% NACL/PF 50 MG/5 ML
SYRINGE (ML) INTRAVENOUS AS NEEDED
Status: DISCONTINUED | OUTPATIENT
Start: 2020-06-04 | End: 2020-06-04 | Stop reason: HOSPADM

## 2020-06-04 RX ORDER — ONDANSETRON 2 MG/ML
4 INJECTION INTRAMUSCULAR; INTRAVENOUS AS NEEDED
Status: DISCONTINUED | OUTPATIENT
Start: 2020-06-04 | End: 2020-06-04 | Stop reason: HOSPADM

## 2020-06-04 RX ORDER — LIDOCAINE HYDROCHLORIDE 20 MG/ML
INJECTION, SOLUTION EPIDURAL; INFILTRATION; INTRACAUDAL; PERINEURAL AS NEEDED
Status: DISCONTINUED | OUTPATIENT
Start: 2020-06-04 | End: 2020-06-04 | Stop reason: HOSPADM

## 2020-06-04 RX ADMIN — SUCCINYLCHOLINE CHLORIDE 180 MG: 20 INJECTION, SOLUTION INTRAMUSCULAR; INTRAVENOUS at 11:34

## 2020-06-04 RX ADMIN — KETOROLAC TROMETHAMINE 30 MG: 30 INJECTION, SOLUTION INTRAMUSCULAR; INTRAVENOUS at 12:47

## 2020-06-04 RX ADMIN — SODIUM CHLORIDE, SODIUM LACTATE, POTASSIUM CHLORIDE, AND CALCIUM CHLORIDE 125 ML/HR: 600; 310; 30; 20 INJECTION, SOLUTION INTRAVENOUS at 10:10

## 2020-06-04 RX ADMIN — ACETAMINOPHEN 650 MG: 325 TABLET, FILM COATED ORAL at 10:17

## 2020-06-04 RX ADMIN — FENTANYL CITRATE 100 MCG: 50 INJECTION, SOLUTION INTRAMUSCULAR; INTRAVENOUS at 11:33

## 2020-06-04 RX ADMIN — ROCURONIUM BROMIDE 35 MG: 10 SOLUTION INTRAVENOUS at 11:47

## 2020-06-04 RX ADMIN — SODIUM CHLORIDE, POTASSIUM CHLORIDE, SODIUM LACTATE AND CALCIUM CHLORIDE: 600; 310; 30; 20 INJECTION, SOLUTION INTRAVENOUS at 11:32

## 2020-06-04 RX ADMIN — HYDROMORPHONE HYDROCHLORIDE 0.5 MG: 2 INJECTION, SOLUTION INTRAMUSCULAR; INTRAVENOUS; SUBCUTANEOUS at 12:06

## 2020-06-04 RX ADMIN — ROCURONIUM BROMIDE 5 MG: 10 SOLUTION INTRAVENOUS at 11:33

## 2020-06-04 RX ADMIN — ONDANSETRON HYDROCHLORIDE 4 MG: 2 INJECTION, SOLUTION INTRAMUSCULAR; INTRAVENOUS at 12:47

## 2020-06-04 RX ADMIN — Medication 5 MG: at 12:15

## 2020-06-04 RX ADMIN — Medication 3 MG: at 12:51

## 2020-06-04 RX ADMIN — DEXAMETHASONE SODIUM PHOSPHATE 8 MG: 4 INJECTION, SOLUTION INTRAMUSCULAR; INTRAVENOUS at 11:54

## 2020-06-04 RX ADMIN — MIDAZOLAM 2 MG: 1 INJECTION INTRAMUSCULAR; INTRAVENOUS at 11:22

## 2020-06-04 RX ADMIN — LIDOCAINE HYDROCHLORIDE 80 MG: 20 INJECTION, SOLUTION EPIDURAL; INFILTRATION; INTRACAUDAL; PERINEURAL at 11:33

## 2020-06-04 RX ADMIN — PROPOFOL 180 MG: 10 INJECTION, EMULSION INTRAVENOUS at 11:33

## 2020-06-04 RX ADMIN — GLYCOPYRROLATE 0.6 MG: 0.2 INJECTION, SOLUTION INTRAMUSCULAR; INTRAVENOUS at 12:51

## 2020-06-04 RX ADMIN — CEFOTETAN DISODIUM 2 G: 2 INJECTION, POWDER, FOR SOLUTION INTRAMUSCULAR; INTRAVENOUS at 11:47

## 2020-06-04 NOTE — BRIEF OP NOTE
Brief Postoperative Note    Patient: Barbaraann Bumpers  YOB: 1985  MRN: [id number]    Date of Procedure: 6/4/2020     Pre-Op Diagnosis: PELVIC MASS    Post-Op Diagnosis: Left ovarian dermoid      Procedure(s):  DIAGNOSTIC LAPAROSCOPY, RESECTION OF MASS, LEFT SALPINGO-OOPHORECTOMY    Surgeon(s): Homero Jordan MD    Surgical Assistant: Physician Assistant: Carolynn Jamison PA-C    Anesthesia: General     Estimated Blood Loss (mL): Minimal    Complications: None    Specimens:   ID Type Source Tests Collected by Time Destination   1 : Left pelvic mass, tube and ovary Frozen Section Abdomen  Homero Jordan MD 6/4/2020 1211 Pathology   1 : Pelvic washing Fresh Pelvis  Homero Jordan MD 6/4/2020 1217 Cytology        Implants: * No implants in log *    Drains: * No LDAs found *    Findings: On laparoscopic survey, there was a 12cm mass arising from the left ovary. Normal appearing uterus and right tube and ovary. Normal appearing omentum, liver edge, diaphragm, small bowel, rectosigmoid colon, and peritoneum. 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      Electronically Signed by Herman Dixon MD on 6/4/2020 at 1:49 PM

## 2020-06-04 NOTE — ANESTHESIA PREPROCEDURE EVALUATION
Relevant Problems   No relevant active problems       Anesthetic History   No history of anesthetic complications            Review of Systems / Medical History  Patient summary reviewed, nursing notes reviewed and pertinent labs reviewed    Pulmonary  Within defined limits                 Neuro/Psych   Within defined limits      Psychiatric history     Cardiovascular  Within defined limits  Hypertension        Dysrhythmias       Exercise tolerance: >4 METS     GI/Hepatic/Renal  Within defined limits   GERD           Endo/Other  Within defined limits  Diabetes         Other Findings              Physical Exam    Airway  Mallampati: II  TM Distance: > 6 cm  Neck ROM: normal range of motion   Mouth opening: Normal     Cardiovascular  Regular rate and rhythm,  S1 and S2 normal,  no murmur, click, rub, or gallop             Dental  No notable dental hx       Pulmonary  Breath sounds clear to auscultation               Abdominal  GI exam deferred       Other Findings            Anesthetic Plan    ASA: 2  Anesthesia type: general          Induction: Intravenous  Anesthetic plan and risks discussed with: Patient

## 2020-06-04 NOTE — ANESTHESIA POSTPROCEDURE EVALUATION
Procedure(s):  DIAGNOSTIC LAPAROSCOPY, RESECTION OF MASS, LEFT SALPINGO-OOPHORECTOMY. general    Anesthesia Post Evaluation        Patient location during evaluation: PACU  Note status: Adequate. Level of consciousness: responsive to verbal stimuli and sleepy but conscious  Pain management: satisfactory to patient  Airway patency: patent  Anesthetic complications: no  Cardiovascular status: acceptable  Respiratory status: acceptable  Hydration status: acceptable  Comments: +Post-Anesthesia Evaluation and Assessment    Patient: Jose De Jesus Guerin MRN: [id number]  SSN: [id number]   YOB: 1985  Age: 30 y.o. Sex: female          Cardiovascular Function/Vital Signs    /64   Pulse 65   Temp 36.6 °C (97.9 °F)   Resp 11   Ht 5' 6\" (1.676 m)   Wt 82.6 kg (182 lb)   SpO2 100%   BMI 29.38 kg/m²     Patient is status post Procedure(s):  DIAGNOSTIC LAPAROSCOPY, RESECTION OF MASS, LEFT SALPINGO-OOPHORECTOMY. Nausea/Vomiting: Controlled. Postoperative hydration reviewed and adequate. Pain:  Pain Scale 1: Numeric (0 - 10) (06/04/20 1330)  Pain Intensity 1: 0 (06/04/20 1330)   Managed. Neurological Status:   Neuro (WDL): Within Defined Limits (06/04/20 1330)   At baseline. Mental Status and Level of Consciousness: Arousable. Pulmonary Status:   O2 Device: Room air (06/04/20 1330)   Adequate oxygenation and airway patent. Complications related to anesthesia: None    Post-anesthesia assessment completed. No concerns. I have evaluated the patient and the patient is stable and ready to be discharged from PACU .     Signed By: Marietta Perez MD    6/4/2020        INITIAL Post-op Vital signs:   Vitals Value Taken Time   /64 6/4/2020  1:45 PM   Temp 36.6 °C (97.9 °F) 6/4/2020  1:30 PM   Pulse 65 6/4/2020  1:45 PM   Resp 11 6/4/2020  1:45 PM   SpO2 100 % 6/4/2020  1:45 PM

## 2020-06-04 NOTE — INTERVAL H&P NOTE
Date of Surgery Update:  Pascale De La O was seen and examined. History and physical has been reviewed. The patient has been examined. There have been no significant clinical changes since the completion of the originally dated History and Physical. Plan resection of mass, USO. Will preserve the uterus and contralateral tube/ovary if possible. Will proceed with staging if indicated based on frozen pathology.      Signed By: Jenae Sierar MD     June 4, 2020 10:35 AM

## 2020-06-04 NOTE — DISCHARGE INSTRUCTIONS
[address] [facility] [address]  P [phone]  F [phone]     Nella Ybarra      Dear Ms. Vero Fraga,      Please review your instructions with your nurse and ask any questions so you have all the information you need to recover well at home. If you do not feel you have everything you need to succeed and be safe after you leave the hospital, please discuss these concerns with your nurse. As always, call for any questions at home. Your doctor: Dr. Neville Corrales  Diagnosis: PELVIC MASS  Procedure: Procedure(s):  DIAGNOSTIC LAPAROSCOPY, RESECTION OF MASS, LEFT SALPINGO-OOPHORECTOMY  Date of Discharge: 6/4/2020      Take Home Medications     See Discharge Medication Review provided to you by your nurse. If you did not receive one, request this prior to your discharge. · It is important that you take your medications as they are prescribed. · Keep your medications in the bottles provided by the pharmacist and keep a list of the medication names, dosages, times to be taken and what they are for in your wallet. · Do not take other medications without consulting your doctor. · If you are prescribed enoxaparin (Lovenox) and are taking a baby aspirin daily, please hold this medication until your course of enoxaparin is completed. · You may take acetaminophen (Tylenol) and an NSAID such as ibuprofen (Advil) or Aleve for pain (but not both). If this is not sufficient for your pain, take tramadol or other pain medication you were provided. · You should take a daily gentle stool softener such as a colace pill or dulcolax suppository for constipation as this is not uncommon after surgery and/or while on pain medication. If not prescribed, this can be found over the counter. If constipation persists for >24 hours you should take a dose of Milk of Magnesia. Call if your constipation continues.       Diet    · Stay hydrated and drink fluids such as gatorade and water. This will also help prevent constipation and dehydration. Limit somewhat any usual caffeine intake of beverages such as soda, tea and coffee as this may serve to dehydrate you. · For the first 2-3 days keep a low fiber diet avoiding raw vegetables or fruits with skin. A diet consisting of soup, cereal, yogurt, eggs, fish, Boost/Ensure. Avoid fatty/greasy foods. · If nauseated, keep your diet limited to liquids and call if this persists. Activity    · If possible, have someone with you at all times until you feel stable. · Gradually increase your activity each day. There are generally no restrictions on walking, climbing stairs or riding in a car. Walk at least 4 times per day. Walking will help reduce the risk of blood clots and constipation. · Showers are okay. If you have an incision, no tub bathing/swimming for two weeks. · No driving for 2 week and/or while on pain medication. · The following restrictions apply:  1. No heavy lifting greater than 15 lbs for 4 weeks, then 25 lbs for the next 4 weeks. 2. You may experience vaginal spotting. Should the bleeding require more that 4 pads a day please call our office. Incision    · You should expect some discomfort in the area of your incision, particularly as you increase your activity. If you notice an area of increasing redness or new drainage, please call your doctor. · Your incisions will have buried, absorbable sutures, which do not need to be removed and are covered by protective glue. This will come off over time. They are covered with a surgical glue. Please allow this to fall off on it's own and refrain from peeling it off unless it is no longer covering the incision. Causes For Concern    If any of the following occur, please call our office and speak with the Nurse/aid who will help you with your problem or ask the doctor to call you.      Problems with the incision, including increasing pain, swelling, redness or drainage.  Inability to pass urine    Increasing abdominal pain despite medication   Persisting nausea or vomiting.  Fever or chills and a temperature >101F   Constipation (no bowel movement for three days).  Diarrhea (more than three watery stools within 24 hours).  Excessive vaginal or wound bleeding.  If after hours and you cannot reach an on-call physician, call 911. Follow-Up    Call [phone] to schedule an appointment with Dr. Jeramy Desai in 6 weeks. Your last dose of Tylenol/acetaminophen was at 1000. May take around 4pm  Last Ibuprofen type drug at 1247. May take after 6pm.           ______________________________________________________________________    Anesthesia Discharge Instructions    After general anesthesia or intervenous sedation, for 24 hours or while taking prescription Narcotics:  · Limit your activities  · Do not drive or operate hazardous machinery  · If you have not urinated within 8 hours after discharge, please contact your surgeon on call. · Do not make important personal or business decisions  · Do not drink alcoholic beverages    Report the following to your surgeon:  · Excessive pain, swelling, redness or odor of or around the surgical area  · Temperature over 100.5 degrees  · Nausea and vomiting lasting longer than 4 hours or if unable to take medication  · Any signs of decreased circulation or nerve impairment to extremity:  Change in color, persistent numbness, tingling, coldness or increased pain. · Any questions    Patient Education        Learning About Coronavirus [phone])  Coronavirus [phone]): Overview  What is coronavirus ([id number])? The coronavirus disease (COVID-19) is caused by a virus. It is an illness that was first found in [place], [place], in December 2019. It has since spread worldwide. The virus can cause fever, cough, and trouble breathing.  In severe cases, it can cause pneumonia and make it hard to breathe without help. It can cause death. Coronaviruses are a large group of viruses. They cause the common cold. They also cause more serious illnesses like Middle East respiratory syndrome (MERS) and severe acute respiratory syndrome (SARS). COVID-19 is caused by a novel coronavirus. That means it's a new type that has not been seen in people before. This virus spreads person-to-person through droplets from coughing and sneezing. It can also spread when you are close to someone who is infected. And it can spread when you touch something that has the virus on it, such as a doorknob or a tabletop. What can you do to protect yourself from coronavirus (COVID-19)? The best way to protect yourself from getting sick is to:  · Avoid areas where there is an outbreak. · Avoid contact with people who may be infected. · Wash your hands often with soap or alcohol-based hand sanitizers. · Avoid crowds and try to stay at least 6 feet away from other people. · Wash your hands often, especially after you cough or sneeze. Use soap and water, and scrub for at least 20 seconds. If soap and water aren't available, use an alcohol-based hand . · Avoid touching your mouth, nose, and eyes. What can you do to avoid spreading the virus to others? To help avoid spreading the virus to others:  · Cover your mouth with a tissue when you cough or sneeze. Then throw the tissue in the trash. · Use a disinfectant to clean things that you touch often. · Wear a cloth face cover if you have to go to public areas. · Stay home if you are sick or have been exposed to the virus. Don't go to school, work, or public areas. And don't use public transportation, ride-shares, or taxis unless you have no choice. · If you are sick:  ? Leave your home only if you need to get medical care. But call the doctor's office first so they know you're coming. And wear a face cover. ? Wear the face cover whenever you're around other people.  It can help stop the spread of the virus when you cough or sneeze. ? Clean and disinfect your home every day. Use household  and disinfectant wipes or sprays. Take special care to clean things that you grab with your hands. These include doorknobs, remote controls, phones, and handles on your refrigerator and microwave. And don't forget countertops, tabletops, bathrooms, and computer keyboards. When to call for help  [id number] anytime you think you may need emergency care. For example, call if:  · You have severe trouble breathing. (You can't talk at all.)  · You have constant chest pain or pressure. · You are severely dizzy or lightheaded. · You are confused or can't think clearly. · Your face and lips have a blue color. · You pass out (lose consciousness) or are very hard to wake up. Call your doctor now if you develop symptoms such as:  · Shortness of breath. · Fever. · Cough. If you need to get care, call ahead to the doctor's office for instructions before you go. Make sure you wear a face cover to prevent exposing other people to the virus. Where can you get the latest information? The following health organizations are tracking and studying this virus. Their websites contain the most up-to-date information. Jolie Re also learn what to do if you think you may have been exposed to the virus. · U.S. Centers for Disease Control and Prevention (CDC): The CDC provides updated news about the disease and travel advice. The website also tells you how to prevent the spread of infection. www.cdc.gov  · World Health Organization [facility]): WHO offers information about the virus outbreaks. WHO also has travel advice. www.who.int  Current as of: May 8, 2020               Content Version: 12.5  © 2006-2020 [employer]. Care instructions adapted under license by [employer] (which disclaims liability or warranty for this information).  If you have questions about a medical condition or this instruction, always ask your healthcare professional. [place] [id number] any warranty or liability for your use of this information.

## 2020-06-05 NOTE — OP NOTES
Gynecologic Oncology Operative Report    Tairq Matias  6/4/2020    PREOPERATIVE DIAGNOSIS:  1) Pelvic mass    POSTOPERATIVE DIAGNOSIS:  1) Left mature cystic teratoma    PROCEDURE:  Diagnostic laparoscopy, resection of mass, left salpingo-oophorectomy    Surgeon:  Maxine Buchanan MD    Assistant:  Darwin Colon surgical assistance was required throughout the case due to the complexity of the procedure. He assisted with dissection, development of the retroperitoneal spaces, and identification of pertinent anatomy during the procedure. Anesthesia:  General endotracheal anesthesia    EBL:  <10 cc    Preoperative antibiotics: Cefotetan 2 grams IV    VTE Prophylaxis: SCDs    Complications:  None    Specimens:  Pelvic mass, left tube/ovary, pelvic washings    Operative indications:  30 y.o. female with enlarging 13cm pelvic mass     Operative findings: On laparoscopic survey, there was a 12cm mass arising from the left ovary. Normal appearing uterus and right tube and ovary. Normal appearing omentum, liver edge, diaphragm, small bowel, rectosigmoid colon, and peritoneum. 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 Operative note:  After the risks, benefits, indications, and alternatives of the procedure were discussed with the patient and informed consent was obtained, the patient was taken to the operating room. She was positively identified, administered general anesthesia, and then placed in the dorsal lithotomy position in [address] [place] [address]. An exam under anesthesia was performed with the above findings. She was then prepped and draped in the usual fashion. A bansal catheter was inserted. An 74RO umbilical incision was then made with #15-blade and carried down to the underlying fascia. The fascia was incised and the peritoneal cavity entered via an open Kamla technique. 10-mm balloon trocar was then placed and intra-peritoneal placement confirmed via direct visualization. The abdomen was surveyed with the above findings. The abdomen was then insufflated with CO2 to a pressure of 15mmHg. Bilateral right and left lower quadrant 5-mm trocars were then inserted under direct visualization. The patient was placed in Trendelenburg and pelvic washings were obtained. Attention was then turned to the pelvis. The mass was arising from the left ovary. The left pelvic side-wall was entered and the left ureter identified and preserved. The left infundibulopelvic ligament was skeletonized, then sealed and divided with the LigaSure device. Dissection was carried along the mesosalpinx to the level of the uterine fundus. The tube and utero-ovarian ligament were then sealed and divided with the LigaSure device at the level of the uterine fundus. The specimen was then placed into a 15-mm EndoCatch bag and brought to the level of the skin at the umbilicus. The cyst was then drained, morcellated, and removed via the umbilical incision without spillage. Hair and sebum were present, and frozen pathology was consistent with a benign dermoid cyst.     The intra-abdominal pressure was then decreased and all planes of dissection were hemostatic. The insufflation was released prior to removal of all trocars. The umbilical fascia was reapproximated with a figure-of-8 stitch using 0-Vicryl on a UR-6 needle. All skin incisions were closed 4-0 monocryl subcuticular stitch. Skin glue was applied to all skin incisions. The patient was taken out of stirrups, awakened from anesthesia, and taken to the recovery room in stable condition. The patient tolerated the procedure well. All instrument, sponge, and needle counts were correct.         Edwige Neville MD  6/4/2020

## 2020-06-11 ENCOUNTER — TELEPHONE (OUTPATIENT)
Dept: GYNECOLOGY | Age: 35
End: 2020-06-11

## 2020-06-11 NOTE — TELEPHONE ENCOUNTER
I called Ms. Peter Adam to discuss her benign surgical pathology below. She did not answer her phone. I was unable to leave a message.      Gwendolyn Cai MD        * * *FINAL PATHOLOGIC DIAGNOSIS* * *          Left ovary and fallopian tube, mass, salpingo-oophorectomy:       Mature cystic teratoma, 72 g in aggregate  Fallopian tube with no specific pathologic changes

## 2020-10-27 ENCOUNTER — OFFICE VISIT (OUTPATIENT)
Dept: GYNECOLOGY | Age: 35
End: 2020-10-27
Payer: MEDICAID

## 2020-10-27 VITALS
SYSTOLIC BLOOD PRESSURE: 133 MMHG | HEIGHT: 66 IN | HEART RATE: 86 BPM | WEIGHT: 184 LBS | BODY MASS INDEX: 29.57 KG/M2 | DIASTOLIC BLOOD PRESSURE: 95 MMHG

## 2020-10-27 DIAGNOSIS — R10.2 PELVIC PAIN: Primary | ICD-10-CM

## 2020-10-27 DIAGNOSIS — R19.00 PELVIC MASS: ICD-10-CM

## 2020-10-27 PROCEDURE — 99214 OFFICE O/P EST MOD 30 MIN: CPT | Performed by: OBSTETRICS & GYNECOLOGY

## 2020-10-27 NOTE — PROGRESS NOTES
[address] [facility] [address], [address]  P [phone]  F [phone]    Office Note  Patient ID:  Name:  León Trevino  MRN:  [id number]  :  1985/35 y.o. Date:  10/28/2020      HISTORY OF PRESENT ILLNESS:  Ms. León Trevino is a 37 y.o. female with a working diagnosis of an enlarging complex pelvic mass, 10-12cm in size. Ca-125 43. Elevated ISAIAS, 11%. On 2020 underwent Diagnostic laparoscopy, resection of mass, left salpingo-oophorectomy. Final pathology consistent with left ovarian mature cystic teratoma. Presents today for postoperative visit, although she has delayed her follow-up for months. She reports that she was doing well after surgery, but in the last month she reports new pelvic pain and pressure. Reports normal menses. Denies relation of the pain/pressure to her menstrual cycle. Denies nausea, vomiting, constipation, diarrhea, bloating, change in appetite or bowel habits. Denies hematuria or hematochezia. Denies fevers or chills. Initial History:  Ms. León Trevino is a 37 y.o.  premenopausal female who presents in consultation from Dr. Allyn Senior for an enlarging complex pelvic mass. The patient reports about 2 months ago severe pain in her left lower quadrant. She reports this improved with time. Some tenderness today. Denies change in her menstrual cycle. Denies dyspareunia. Denies change in appetite or bowel habits, nausea, vomiting, constipation, diarrhea, CP, SOB, bloating, or urinary symptoms. She has one child and has had one . She is unsure if she would like to maintain her fertility, but she also states that \"you should take out anything you need to\". Pertinent PMH/PSH: Smoker 1/2 PPD     Active, no restrictions.      Pathology Review:   2020:  FINAL PATHOLOGIC DIAGNOSIS   Left ovary and fallopian tube, mass, salpingo-oophorectomy:   Mature cystic teratoma, 72 g in aggregate   Fallopian tube with no specific pathologic changes     Imaging Review:   Pelvic ultrasound 2020: Impression: Normal uterus. Normal right ovary. 10-cm complex mass in left adnexa, significantly larger when compared to prior ultrasound. No free fluid. Pelvic ultrasound 3/23/2020: Impression: Enlarged left ovary. Normal uterus. Left ovary: abnormal, difficult to distinguish ovarian tissue. Size 8.4cm x 7.9cm x 5.9cm. Lab Review:  2020:  LDH = 152 (normal)  HCB <1  AFP = 7.5 (normal)  Ca-125 = 43.6  HE-4 =60.3      ROS:  A comprehensive review of systems was negative except for that written in the History of Present Illness. , 10 point ROS    OB/GYN ROS:  Patient denies significant menstrual problems.     ECOG ndGndrndanddndend:nd nd2nd Problem List:  Patient Active Problem List    Diagnosis Date Noted    Pelvic pain 10/28/2020    Pelvic mass 2020    Mild preeclampsia, third trimester 10/16/2018    [address] (pregnancy induced hypertension), third trimester 10/03/2018    Situational mixed anxiety and depressive disorder 2015    Vitamin D deficiency 10/20/2015    Multiple allergies 10/20/2015     PMH:  Past Medical History:   Diagnosis Date    Arrhythmia     MURMUR    Essential hypertension     pre HTN for the past 2 years, No meds    Genital herpes     GERD (gastroesophageal reflux disease)     Gestational diabetes     No Meds, Diet controlled    Gestational hypertension     Headache     Psychiatric disorder     Anxiety and depression    Scoliosis     Vitamin D deficiency           PSH:  Past Surgical History:   Procedure Laterality Date    BREAST SURGERY PROCEDURE UNLISTED      bilat breast reduction    HX GYN  2007    BREAST REDUCTION    HX GYN  2015        HX HEENT      tonsillectomy      Social History:  Social History     Tobacco Use    Smoking status: Current Some Day Smoker     Packs/day: 0.50     Years: 15.00     Pack years: 7.50    Smokeless tobacco: Never Used   Substance Use Topics  Alcohol use: No     Alcohol/week: 0.0 standard drinks     Comment: rare since Dec 2014      Family History:  Family History   Problem Relation Age of Onset    Hypertension Father     No Known Problems Brother     Heart Disease Maternal Aunt     Cancer Maternal Uncle     Cancer Paternal Uncle     Heart Disease Paternal Uncle     Heart Disease Maternal Grandmother     Heart Disease Paternal Grandmother     Heart Disease Paternal Grandfather     Breast Cancer Other         maternal great aunt    Anesth Problems Neg Hx       Medications: (reviewed)  Current Outpatient Medications   Medication Sig    ibuprofen (MOTRIN) 200 mg tablet Take 3 Tabs by mouth every eight (8) hours as needed for Pain.  NIFEdipine ER (PROCARDIA XL) 30 mg ER tablet TAKE 1 TABLET BY MOUTH ONCE DAILY IN THE MORNING    escitalopram oxalate (LEXAPRO) 10 mg tablet Take 20 mg by mouth nightly.  docusate sodium (Colace) 100 mg capsule Take 1 Cap by mouth two (2) times a day. Hold for loose stools. No current facility-administered medications for this visit. Allergies: (reviewed)  Allergies   Allergen Reactions    Banana Itching    Carrot Itching    Peach (Prunus Persica) Itching    Tree Nut Itching    Watermelon Itching    Zoloft [Sertraline] Rash        Gyn History:   Last pap: 3/9/2020, normal but HPV positive. Unsure of 16/18 status. History of abnormal pap: denies  History of STI: herpes  Menses: normal  Contraception: none currently. OBJECTIVE:  Physical Exam:  Visit Vitals  BP (!) 133/95 (BP 1 Location: Left arm, BP Patient Position: Sitting)   Pulse 86   Ht 5' 5.98\" (1.676 m)   Wt 184 lb (83.5 kg)   BMI 29.71 kg/m²      General: Alert and oriented. No acute distress. Well-nourished  Respiratory: clear to auscultation and percussion to the bases. No CVAT. Cardiovascular: regular rate and rhythm. No murmurs, rubs, or gallops. Gastrointestinal: soft, non-tender, non-distended, no masses or organomegaly. Well-healed incisions. Musculoskeletal: normal gait. No joint tenderness, deformity or swelling. No muscular tenderness. Extremities: extremities normal, atraumatic, no cyanosis or edema. Pelvic: deferred for discussion. Neuro: Grossly intact. Normal gait and movement. No acute deficit  Skin: No evidence of rashes or skin changes. Lab Date as available:    Lab Results   Component Value Date/Time    WBC 7.8 05/29/2020 10:45 AM    HGB 14.8 05/29/2020 10:45 AM    HCT 44.6 05/29/2020 10:45 AM    PLATELET 976 10/95/0439 10:45 AM    MCV 91.4 05/29/2020 10:45 AM     Lab Results   Component Value Date/Time    Sodium 138 05/29/2020 10:45 AM    Potassium 3.7 05/29/2020 10:45 AM    Chloride 106 05/29/2020 10:45 AM    CO2 26 05/29/2020 10:45 AM    Anion gap 6 05/29/2020 10:45 AM    Glucose 136 (H) 05/29/2020 10:45 AM    BUN 6 05/29/2020 10:45 AM    Creatinine 0.84 05/29/2020 10:45 AM    BUN/Creatinine ratio 7 (L) 05/29/2020 10:45 AM    GFR est AA >60 05/29/2020 10:45 AM    GFR est non-AA >60 05/29/2020 10:45 AM    Calcium 9.7 05/29/2020 10:45 AM         IMPRESSION/PLAN:    Ms. Caresse Klinefelter is a 37 y.o. female with a working diagnosis of an enlarging complex pelvic mass, 10-12cm in size. Ca-125 43. Elevated ISAIAS, 11%. On 6/4/2020 underwent Diagnostic laparoscopy, resection of mass, left salpingo-oophorectomy. Final pathology consistent with left ovarian mature cystic teratoma. New pelvic pain/pressure in last month. Problems:     Patient Active Problem List    Diagnosis Date Noted    Pelvic pain 10/28/2020    Pelvic mass 05/29/2020    Mild preeclampsia, third trimester 10/16/2018    PIH (pregnancy induced hypertension), third trimester 10/03/2018    Situational mixed anxiety and depressive disorder 12/02/2015    Vitamin D deficiency 10/20/2015    Multiple allergies 10/20/2015       Reviewed patient's course to date, including her benign surgical pathology consistent with an ovarian dermoid.  She has healed well from surgery. She reports new pelvic pain/pressure in the last month. I have recommended a repeat pelvic ultrasound to be sure she has not developed another ovarian cyst on the contralateral ovary. If her ultrasound is normal, and given her benign pathology, the patient may be discharged from Gynecologic Oncology clinic and I will encourage her to follow-up with her ObGyn for continued workup and routine gynecologic care. It may simply just be return of her menses and associated with her ovulatory cycle. We will discuss the results via virtual visit. All questions and concerns were addressed with the patient and she is comfortable with the plan.      Osiris Wallis MD

## 2020-10-28 PROBLEM — R10.2 PELVIC PAIN: Status: ACTIVE | Noted: 2020-10-28

## 2020-12-30 ENCOUNTER — VIRTUAL VISIT (OUTPATIENT)
Dept: SLEEP MEDICINE | Age: 35
End: 2020-12-30
Payer: MEDICAID

## 2020-12-30 ENCOUNTER — DOCUMENTATION ONLY (OUTPATIENT)
Dept: SLEEP MEDICINE | Age: 35
End: 2020-12-30

## 2020-12-30 DIAGNOSIS — F41.9 ANXIETY AND DEPRESSION: ICD-10-CM

## 2020-12-30 DIAGNOSIS — G47.33 OSA (OBSTRUCTIVE SLEEP APNEA): Primary | ICD-10-CM

## 2020-12-30 DIAGNOSIS — F32.A ANXIETY AND DEPRESSION: ICD-10-CM

## 2020-12-30 PROCEDURE — 99203 OFFICE O/P NEW LOW 30 MIN: CPT | Performed by: INTERNAL MEDICINE

## 2020-12-30 NOTE — PATIENT INSTRUCTIONS
[address] [place]. [address] [facility], [address] Tel.  [phone] Fax. 100 [facility] [address], [address] [facility] Tel.  [phone] Fax. [phone] [address] Tel.  [phone] Fax. [phone] Sleep Apnea: After Your Visit Your Care Instructions Sleep apnea occurs when you frequently stop breathing for 10 seconds or longer during sleep. It can be mild to severe, based on the number of times per hour that you stop breathing or have slowed breathing. Blocked or narrowed airways in your nose, mouth, or throat can cause sleep apnea. Your airway can become blocked when your throat muscles and tongue relax during sleep. Sleep apnea is common, occurring in 1 out of 20 individuals. Individuals having any of the following characteristics should be evaluated and treated right away due to high risk and detrimental consequences from untreated sleep apnea: 
1. Obesity 2. Congestive Heart failure 3. Atrial Fibrillation 4. Uncontrolled Hypertension 5. Type II Diabetes 6. Night-time Arrhythmias 7. Stroke 8. Pulmonary Hypertension 9. High-risk Driving Populations (pilots, truck drivers, etc.) 10. Patients Considering Weight-loss Surgery How do you know you have sleep apnea? You probably have sleep apnea if you answer 'yes' to 3 or more of the following questions: S - Have you been told that you Snore? T - Are you often Tired during the day? O - Has anyone Observed you stop breathing while sleeping? P- Do you have (or are being treated for) high blood Pressure? B - Are you obese (Body Mass Index > 35)? A - Is your Age 48years old or older? N - Is your Neck size greater than 16 inches? G - Are you male Gender? A sleep physician can prescribe a breathing device that prevents tissues in the throat from blocking your airway. Or your doctor may recommend using a dental device (oral breathing device) to help keep your airway open. In some cases, surgery may be needed to remove enlarged tissues in the throat. Follow-up care is a key part of your treatment and safety. Be sure to make and go to all appointments, and call your doctor if you are having problems. It's also a good idea to know your test results and keep a list of the medicines you take. How can you care for yourself at home? · Lose weight, if needed. It may reduce the number of times you stop breathing or have slowed breathing. · Go to bed at the same time every night. · Sleep on your side. It may stop mild apnea. If you tend to roll onto your back, sew a pocket in the back of your pajama top. Put a tennis ball into the pocket, and stitch the pocket shut. This will help keep you from sleeping on your back. · Avoid alcohol and medicines such as sleeping pills and sedatives before bed. · Do not smoke. Smoking can make sleep apnea worse. If you need help quitting, talk to your doctor about stop-smoking programs and medicines. These can increase your chances of quitting for good. · Prop up the head of your bed 4 to 6 inches by putting bricks under the legs of the bed. · Treat breathing problems, such as a stuffy nose, caused by a cold or allergies. · Use a continuous positive airway pressure (CPAP) breathing machine if lifestyle changes do not help your apnea and your doctor recommends it. The machine keeps your airway from closing when you sleep. · If CPAP does not help you, ask your doctor whether you should try other breathing machines. A bilevel positive airway pressure machine has two types of air pressureâone for breathing in and one for breathing out. Another device raises or lowers air pressure as needed while you breathe. · If your nose feels dry or bleeds when using one of these machines, talk with your doctor about increasing moisture in the air. A humidifier may help. · If your nose is runny or stuffy from using a breathing machine, talk with your doctor about using decongestants or a corticosteroid nasal spray. When should you call for help? Watch closely for changes in your health, and be sure to contact your doctor if: 
· You still have sleep apnea even though you have made lifestyle changes. · You are thinking of trying a device such as CPAP. · You are having problems using a CPAP or similar machine. Where can you learn more? Go to [employer]. Enter B932 in the search box to learn more about \"Sleep Apnea: After Your Visit. \"  
© 8787-4259 [employer]wise, [employer]orporated. Care instructions adapted under license by [address] [place] [address] (which disclaims liability or warranty for this information). This care instruction is for use with your licensed healthcare professional. If you have questions about a medical condition or this instruction, always ask your healthcare professional. Danial Condon any warranty or liability for your use of this information. PROPER SLEEP HYGIENE What to avoid · Do not have drinks with caffeine, such as coffee or black tea, for 8 hours before bed. · Do not smoke or use other types of tobacco near bedtime. Nicotine is a stimulant and can keep you awake. · Avoid drinking alcohol late in the evening, because it can cause you to wake in the middle of the night. · Do not eat a big meal close to bedtime. If you are hungry, eat a light snack. · Do not drink a lot of water close to bedtime, because the need to urinate may wake you up during the night. · Do not read or watch TV in bed. Use the bed only for sleeping and sexual activity. What to try · Go to bed at the same time every night, and wake up at the same time every morning. Do not take naps during the day. · Keep your bedroom quiet, dark, and cool. · Get regular exercise, but not within 3 to 4 hours of your bedtime. Lalito Graham · Sleep on a comfortable pillow and mattress. · If watching the clock makes you anxious, turn it facing away from you so you cannot see the time. · If you worry when you lie down, start a worry book. Well before bedtime, write down your worries, and then set the book and your concerns aside. · Try meditation or other relaxation techniques before you go to bed. · If you cannot fall asleep, get up and go to another room until you feel sleepy. Do something relaxing. Repeat your bedtime routine before you go to bed again. · Make your house quiet and calm about an hour before bedtime. Turn down the lights, turn off the TV, log off the computer, and turn down the volume on music. This can help you relax after a busy day. Drowsy Driving The [place] [address] cites drowsiness as a causing factor in more than 968,062 police reported crashes annually, resulting in 76,000 injuries and 1,500 deaths. Other surveys suggest 55% of people polled have driven while drowsy in the past year, 23% had fallen asleep but not crashed, 3% crashed, and 2% had and accident due to drowsy driving. Who is at risk? Young Drivers: One study of drowsy driving accidents states that 55% of the drivers were under 25 years. Of those, 75% were male. Shift Workers and Travelers: People who work overnight or travel across time zones frequently are at higher risk of experiencing Circadian Rhythm Disorders. They are trying to work and function when their body is programed to sleep. Sleep Deprived: Lack of sleep has a serious impact on your ability to pay attention or focus on a task. Consistently getting less than the average of 8 hours your body needs creates partial or cumulative sleep deprivation. Untreated Sleep Disorders: Sleep Apnea, Narcolepsy, R.L.S., and other sleep disorders (untreated) prevent a person from getting enough restful sleep. This leads to excessive daytime sleepiness and increases the risk for drowsy driving accidents by up to 7 times. Medications / Alcohol: Even over the counter medications can cause drowsiness. Medications that impair a drivers attention should have a warning label. Alcohol naturally makes you sleepy and on its own can cause accidents. Combined with excessive drowsiness its effects are amplified. Signs of Drowsy Driving: * You don't remember driving the last few miles * You may drift out of your hector * You are unable to focus and your thoughts wander * You may yawn more often than normal 
 * You have difficulty keeping your eyes open / nodding off * Missing traffic signs, speeding, or tailgating Prevention-  
Good sleep hygiene, lifestyle and behavioral choices have the most impact on drowsy driving. There is no substitute for sleep and the average person requires 8 hours nightly. If you find yourself driving drowsy, stop and sleep. Consider the sleep hygiene tips provided during your visit as well. Medication Refill Policy: Refills for all medications require 1 week advance notice. Please have your pharmacy fax a refill request. We are unable to fax, or call in \"controled substance\" medications and you will need to pick these prescriptions up from our office. MyChart Activation Thank you for requesting access to [facility]. Please follow the instructions below to securely access and download your online medical record. [facility] allows you to send messages to your doctor, view your test results, renew your prescriptions, schedule appointments, and more. How Do I Sign Up? 1. In your internet browser, go to [email or web address] 2. Click on the First Time User? Click Here link in the Sign In box. You will see the New Member Sign Up page. 3. Enter your [facility] Access Code exactly as it appears below. You will not need to use this code after youve completed the sign-up process. If you do not sign up before the expiration date, you must request a new code. [facility] Access Code: [email or web address] Expires: 2021  8:59 AM (This is the date your [facility] access code will ) 4. Enter the last four digits of your Social Security Number (xxxx) and Date of Birth (mm/dd/yyyy) as indicated and click Submit. You will be taken to the next sign-up page. 5. Create a [facility] ID. This will be your [facility] login ID and cannot be changed, so think of one that is secure and easy to remember. 6. Create a [facility] password. You can change your password at any time. 7. Enter your Password Reset Question and Answer. This can be used at a later time if you forget your password. 8. Enter your e-mail address. You will receive e-mail notification when new information is available in [address]. 9. Click Sign Up. You can now view and download portions of your medical record. 10. Click the Download Summary menu link to download a portable copy of your medical information. Additional Information If you have questions, please call [phone]. Remember, [facility] is NOT to be used for urgent needs. For medical emergencies, dial 911.
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
## 2021-01-07 ENCOUNTER — TELEPHONE (OUTPATIENT)
Dept: SLEEP MEDICINE | Age: 36
End: 2021-01-07

## 2021-01-07 NOTE — TELEPHONE ENCOUNTER
S>Janay Paez is a 37 y.o. female seen today to receive a home sleep testing unit (HST). · Patient was educated on proper hookup and operation of the HST via detailed instruction sheet (per COVID-19 precautions)  · Instruction forms with after hours contact and documentation were signed. O>    @VS@ @[email or web address]([id number])@    A>  No diagnosis found. P>  · General information regarding operations and maintenance of the device was provided. · Follow-up appointment was made to return the HST. She will be contacted once the results have been reviewed. · She was asked to contact our office for any problems regarding her home sleep test study.

## 2021-01-12 ENCOUNTER — TELEPHONE (OUTPATIENT)
Dept: SLEEP MEDICINE | Age: 36
End: 2021-01-12

## 2021-01-12 DIAGNOSIS — G47.33 OSA (OBSTRUCTIVE SLEEP APNEA): Primary | ICD-10-CM

## 2021-01-12 NOTE — TELEPHONE ENCOUNTER
Jose De Jesus Guerin is to be contacted by lead sleep technologist regarding results of Sleep Testing which was indicative of an average AHI of 3.7 per hour with an SpO2 yelitza of 76% and SpO2 of < 88% being 1.0 minutes. Patient should return for repeat home sleep apnea testing. Encounter Diagnosis   Name Primary?     PIERCE (obstructive sleep apnea) Yes     Orders Placed This Encounter    SLEEP STUDY UNATTENDED, 4 CHANNEL     Order Specific Question:   Reason for Exam     Answer:   PIERCE

## 2021-01-13 NOTE — TELEPHONE ENCOUNTER
Reviewed sleep study results with patient. She expressed understanding and is willing to proceed with repeat HSAT .

## 2021-01-26 ENCOUNTER — HOSPITAL ENCOUNTER (OUTPATIENT)
Dept: SLEEP MEDICINE | Age: 36
Discharge: HOME | End: 2021-01-26
Payer: MEDICAID

## 2021-01-26 ENCOUNTER — OFFICE VISIT (OUTPATIENT)
Dept: SLEEP MEDICINE | Age: 36
End: 2021-01-26

## 2021-01-26 DIAGNOSIS — G47.33 OSA (OBSTRUCTIVE SLEEP APNEA): Primary | ICD-10-CM

## 2021-01-26 PROCEDURE — 95806 SLEEP STUDY UNATT&RESP EFFT: CPT

## 2021-01-26 NOTE — PROGRESS NOTES
S>Janay Paez is a 37 y.o. female seen today to receive a home sleep testing unit (HST). · Patient was educated on proper hookup and operation of the HST via detailed instruction sheet (per COVID-19 precautions)  · Instruction forms with after hours contact and documentation were signed. O>    There were no vitals taken for this visit. A>  No diagnosis found. P>  · General information regarding operations and maintenance of the device was provided. · Follow-up appointment was made to return the HST. She will be contacted once the results have been reviewed. · She was asked to contact our office for any problems regarding her home sleep test study.

## 2021-02-02 ENCOUNTER — TELEPHONE (OUTPATIENT)
Dept: SLEEP MEDICINE | Age: 36
End: 2021-02-02

## 2021-02-02 DIAGNOSIS — G47.33 OSA (OBSTRUCTIVE SLEEP APNEA): Primary | ICD-10-CM

## 2021-02-02 NOTE — TELEPHONE ENCOUNTER
Sagrario Madera is to be contacted by lead sleep technologist regarding results of Sleep Testing which was indicative of an average AHI of 42.9 per hour with an SpO2 yelitza of 85% and SpO2 of < 88% being 1 minutes. An APAP prescription has been written and patient will be contacted by office staff regarding follow-up  in 2-3 months after initiation of therapy. Encounter Diagnosis   Name Primary?  PIERCE (obstructive sleep apnea) Yes       Orders Placed This Encounter    AMB SUPPLY ORDER     Diagnosis: (G47.33) PIERCE (obstructive sleep apnea)  (primary encounter diagnosis)     [employer] DreamStation ( Unit - Auto Mode) / Heated Humidifier :    Positive Airway Pressure Therapy: Duration of need: 99 months. Auto - PAP: 4 - 20 cmH2O; Optistart enabled. Ramp Time: 30 Minutes; Flex: 2. Remote monitoring enrollment.  Nasal Cushion (Replace) 2 per month.  Nasal Interface Mask 1 every 3 months.  Headgear 1 every 6 months.  Filter(s) Disposable 2 per month.  Filter(s) Non-Disposable 1 every 6 months. [address] for Memo Thong (Replace) 1 every 6 months.  Tubing with heating element 1 every 3 months. Perform Mask Fitting per patient preference and comfort - replace as above. Norberto Medina MD, [facility]; NPI: [id number]  Electronically signed. 02/02/21

## 2022-03-18 PROBLEM — O13.3 PIH (PREGNANCY INDUCED HYPERTENSION), THIRD TRIMESTER: Status: ACTIVE | Noted: 2018-10-03

## 2022-03-18 PROBLEM — O14.03 MILD PREECLAMPSIA, THIRD TRIMESTER: Status: ACTIVE | Noted: 2018-10-16

## 2022-03-19 PROBLEM — R19.00 PELVIC MASS: Status: ACTIVE | Noted: 2020-05-29

## 2022-03-19 PROBLEM — R10.2 PELVIC PAIN: Status: ACTIVE | Noted: 2020-10-28

## 2023-05-11 RX ORDER — ESCITALOPRAM OXALATE 10 MG/1
20 TABLET ORAL NIGHTLY
COMMUNITY

## 2023-05-11 RX ORDER — PSEUDOEPHEDRINE HCL 30 MG
TABLET ORAL 2 TIMES DAILY
COMMUNITY
Start: 2020-06-04

## 2023-05-11 RX ORDER — NIFEDIPINE 30 MG/1
1 TABLET, EXTENDED RELEASE ORAL EVERY MORNING
COMMUNITY
Start: 2020-05-15

## 2023-05-11 RX ORDER — IBUPROFEN 200 MG
TABLET ORAL EVERY 8 HOURS PRN
COMMUNITY
Start: 2020-06-04

## (undated) DEVICE — SOLUTION IV 1000ML 0.9% SOD CHL

## (undated) DEVICE — STERILE POLYISOPRENE POWDER-FREE SURGICAL GLOVES WITH EMOLLIENT COATING: Brand: PROTEXIS

## (undated) DEVICE — DRAPE,REIN 53X77,STERILE: Brand: MEDLINE

## (undated) DEVICE — TROCAR: Brand: KII® SLEEVE

## (undated) DEVICE — Device

## (undated) DEVICE — SYR 10ML LUER LOK 1/5ML GRAD --

## (undated) DEVICE — TISSUE RETRIEVAL SYSTEM: Brand: INZII RETRIEVAL SYSTEM

## (undated) DEVICE — INFECTION CONTROL KIT SYS

## (undated) DEVICE — REM POLYHESIVE ADULT PATIENT RETURN ELECTRODE: Brand: VALLEYLAB

## (undated) DEVICE — TRAY PREP DRY W/ PREM GLV 2 APPL 6 SPNG 2 UNDPD 1 OVERWRAP

## (undated) DEVICE — PREP SKN CHLRAPRP APL 26ML STR --

## (undated) DEVICE — PAD,SANITARY,11 IN,MAXI,N-STRL,IND WRAP: Brand: MEDLINE

## (undated) DEVICE — SEALER ENDOSCP L37CM NANO COAT BLNT TIP LAP DIV

## (undated) DEVICE — STRAP,POSITIONING,KNEE/BODY,FOAM,4X60": Brand: MEDLINE

## (undated) DEVICE — SUTURE MCRYL SZ 4-0 L27IN ABSRB UD L19MM PS-2 1/2 CIR PRIM Y426H

## (undated) DEVICE — TOWEL SURG W17XL27IN STD BLU COT NONFENESTRATED PREWASHED

## (undated) DEVICE — GARMENT,MEDLINE,DVT,INT,CALF,MED, GEN2: Brand: MEDLINE

## (undated) DEVICE — TROCARS: Brand: KII® BALLOON BLUNT TIP SYSTEM

## (undated) DEVICE — NEEDLE HYPO 22GA L1.5IN BLK S STL HUB POLYPR SHLD REG BVL

## (undated) DEVICE — COVER LT HNDL PLAS RIG 1 PER PK

## (undated) DEVICE — 40418 TRENDELENBURG ONE-STEP ARM PROTECTORS LARGE (1 PAIR): Brand: 40418 TRENDELENBURG ONE-STEP ARM PROTECTORS LARGE (1 PAIR)

## (undated) DEVICE — TOTAL TRAY, DB, 100% SILI FOLEY, 16FR 10: Brand: MEDLINE

## (undated) DEVICE — STERILE NEOPRENE POWDER-FREE SURGICAL GLOVES WITH NITRILE COATING: Brand: PROTEXIS

## (undated) DEVICE — BLADE ASSEMB CLP HAIR FINE --

## (undated) DEVICE — DEVICE TRNSF SPIK STL 2008S] MICROTEK MEDICAL INC]

## (undated) DEVICE — PAD PT POS 36 IN SURGYPAD DISP

## (undated) DEVICE — TUBING, SUCTION, 1/4" X 10', STRAIGHT: Brand: MEDLINE

## (undated) DEVICE — LAPAROSCOPIC TROCAR SLEEVE/SINGLE USE: Brand: KII® OPTICAL ACCESS SYSTEM

## (undated) DEVICE — DERMABOND SKIN ADH 0.7ML -- DERMABOND ADVANCED 12/BX

## (undated) DEVICE — SYR 50ML LR LCK 1ML GRAD NSAF --

## (undated) DEVICE — SUTURE SZ 0 27IN 5/8 CIR UR-6  TAPER PT VIOLET ABSRB VICRYL J603H

## (undated) DEVICE — SCISSORS ENDOSCP DIA5MM CRV MPLR CAUT W/ RATCH HNDL

## (undated) DEVICE — INTENDED FOR TISSUE SEPARATION, AND OTHER PROCEDURES THAT REQUIRE A SHARP SURGICAL BLADE TO PUNCTURE OR CUT.: Brand: BARD-PARKER ® CARBON RIB-BACK BLADES

## (undated) DEVICE — SURGICAL PROCEDURE PACK GYN LAPAROSCOPY CUST SMH LF